# Patient Record
Sex: MALE | Race: WHITE | Employment: OTHER | URBAN - METROPOLITAN AREA
[De-identification: names, ages, dates, MRNs, and addresses within clinical notes are randomized per-mention and may not be internally consistent; named-entity substitution may affect disease eponyms.]

---

## 2017-01-09 ENCOUNTER — TELEPHONE (OUTPATIENT)
Dept: INTERNAL MEDICINE CLINIC | Age: 72
End: 2017-01-09

## 2017-01-12 ENCOUNTER — OFFICE VISIT (OUTPATIENT)
Dept: INTERNAL MEDICINE CLINIC | Age: 72
End: 2017-01-12

## 2017-01-12 VITALS
DIASTOLIC BLOOD PRESSURE: 74 MMHG | BODY MASS INDEX: 44.02 KG/M2 | HEART RATE: 64 BPM | WEIGHT: 233 LBS | SYSTOLIC BLOOD PRESSURE: 116 MMHG | RESPIRATION RATE: 14 BRPM

## 2017-01-12 DIAGNOSIS — E11.22 TYPE 2 DIABETES MELLITUS WITH STAGE 3 CHRONIC KIDNEY DISEASE, WITH LONG-TERM CURRENT USE OF INSULIN (HCC): ICD-10-CM

## 2017-01-12 DIAGNOSIS — Z79.4 TYPE 2 DIABETES MELLITUS WITH STAGE 3 CHRONIC KIDNEY DISEASE, WITH LONG-TERM CURRENT USE OF INSULIN (HCC): ICD-10-CM

## 2017-01-12 DIAGNOSIS — E04.1 LEFT THYROID NODULE: ICD-10-CM

## 2017-01-12 DIAGNOSIS — I10 ESSENTIAL HYPERTENSION: ICD-10-CM

## 2017-01-12 DIAGNOSIS — S22.41XA CLOSED FRACTURE OF MULTIPLE RIBS OF RIGHT SIDE, INITIAL ENCOUNTER: Primary | ICD-10-CM

## 2017-01-12 DIAGNOSIS — N18.30 CRF (CHRONIC RENAL FAILURE), STAGE 3 (MODERATE) (HCC): ICD-10-CM

## 2017-01-12 DIAGNOSIS — M48.061 LUMBAR SPINAL STENOSIS: ICD-10-CM

## 2017-01-12 DIAGNOSIS — N18.30 TYPE 2 DIABETES MELLITUS WITH STAGE 3 CHRONIC KIDNEY DISEASE, WITH LONG-TERM CURRENT USE OF INSULIN (HCC): ICD-10-CM

## 2017-01-12 PROCEDURE — 99213 OFFICE O/P EST LOW 20 MIN: CPT | Performed by: INTERNAL MEDICINE

## 2017-01-26 ENCOUNTER — OFFICE VISIT (OUTPATIENT)
Dept: INTERNAL MEDICINE CLINIC | Age: 72
End: 2017-01-26

## 2017-01-26 VITALS
SYSTOLIC BLOOD PRESSURE: 120 MMHG | WEIGHT: 223 LBS | BODY MASS INDEX: 42.14 KG/M2 | DIASTOLIC BLOOD PRESSURE: 70 MMHG | HEART RATE: 64 BPM | RESPIRATION RATE: 12 BRPM

## 2017-01-26 DIAGNOSIS — N18.30 CRF (CHRONIC RENAL FAILURE), STAGE 3 (MODERATE) (HCC): Primary | ICD-10-CM

## 2017-01-26 DIAGNOSIS — E66.01 MORBID OBESITY WITH BMI OF 40.0-44.9, ADULT (HCC): ICD-10-CM

## 2017-01-26 DIAGNOSIS — Z79.4 TYPE 2 DIABETES MELLITUS WITH STAGE 3 CHRONIC KIDNEY DISEASE, WITH LONG-TERM CURRENT USE OF INSULIN (HCC): ICD-10-CM

## 2017-01-26 DIAGNOSIS — N18.30 TYPE 2 DIABETES MELLITUS WITH STAGE 3 CHRONIC KIDNEY DISEASE, WITH LONG-TERM CURRENT USE OF INSULIN (HCC): ICD-10-CM

## 2017-01-26 DIAGNOSIS — I10 ESSENTIAL HYPERTENSION: ICD-10-CM

## 2017-01-26 DIAGNOSIS — S22.41XA CLOSED FRACTURE OF MULTIPLE RIBS OF RIGHT SIDE, INITIAL ENCOUNTER: ICD-10-CM

## 2017-01-26 DIAGNOSIS — E11.22 TYPE 2 DIABETES MELLITUS WITH STAGE 3 CHRONIC KIDNEY DISEASE, WITH LONG-TERM CURRENT USE OF INSULIN (HCC): ICD-10-CM

## 2017-01-26 DIAGNOSIS — M48.061 LUMBAR SPINAL STENOSIS: ICD-10-CM

## 2017-01-26 PROCEDURE — 99213 OFFICE O/P EST LOW 20 MIN: CPT | Performed by: INTERNAL MEDICINE

## 2017-01-26 RX ORDER — OXYCODONE HYDROCHLORIDE AND ACETAMINOPHEN 5; 325 MG/1; MG/1
1 TABLET ORAL EVERY 8 HOURS PRN
Qty: 120 TABLET | Refills: 0 | Status: SHIPPED | OUTPATIENT
Start: 2017-01-26 | End: 2017-02-27 | Stop reason: SDUPTHER

## 2017-02-24 ENCOUNTER — TELEPHONE (OUTPATIENT)
Dept: INTERNAL MEDICINE CLINIC | Age: 72
End: 2017-02-24

## 2017-02-27 ENCOUNTER — OFFICE VISIT (OUTPATIENT)
Dept: INTERNAL MEDICINE CLINIC | Age: 72
End: 2017-02-27

## 2017-02-27 ENCOUNTER — TELEPHONE (OUTPATIENT)
Dept: INTERNAL MEDICINE CLINIC | Age: 72
End: 2017-02-27

## 2017-02-27 VITALS
WEIGHT: 230 LBS | HEART RATE: 76 BPM | SYSTOLIC BLOOD PRESSURE: 130 MMHG | BODY MASS INDEX: 43.46 KG/M2 | DIASTOLIC BLOOD PRESSURE: 70 MMHG

## 2017-02-27 DIAGNOSIS — M54.50 ACUTE BILATERAL LOW BACK PAIN WITHOUT SCIATICA: ICD-10-CM

## 2017-02-27 DIAGNOSIS — S22.41XS CLOSED FRACTURE OF MULTIPLE RIBS OF RIGHT SIDE, SEQUELA: ICD-10-CM

## 2017-02-27 DIAGNOSIS — R26.9 GAIT DIFFICULTY: ICD-10-CM
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
## 2017-02-27 PROCEDURE — 99213 OFFICE O/P EST LOW 20 MIN: CPT | Performed by: INTERNAL MEDICINE

## 2017-02-27 RX ORDER — OXYCODONE HYDROCHLORIDE AND ACETAMINOPHEN 5; 325 MG/1; MG/1
1 TABLET ORAL EVERY 8 HOURS PRN
Qty: 120 TABLET | Refills: 0 | Status: SHIPPED | OUTPATIENT
Start: 2017-02-27 | End: 2017-03-28 | Stop reason: SDUPTHER

## 2017-03-21 ENCOUNTER — OFFICE VISIT (OUTPATIENT)
Dept: INTERNAL MEDICINE CLINIC | Age: 72
End: 2017-03-21

## 2017-03-21 VITALS
HEART RATE: 90 BPM | DIASTOLIC BLOOD PRESSURE: 75 MMHG | OXYGEN SATURATION: 96 % | TEMPERATURE: 98.5 F | BODY MASS INDEX: 42.51 KG/M2 | WEIGHT: 225 LBS | SYSTOLIC BLOOD PRESSURE: 135 MMHG

## 2017-03-21 DIAGNOSIS — E78.2 MIXED HYPERLIPIDEMIA: ICD-10-CM

## 2017-03-21 DIAGNOSIS — I10 ESSENTIAL HYPERTENSION: ICD-10-CM

## 2017-03-21 DIAGNOSIS — E11.22 TYPE 2 DIABETES MELLITUS WITH STAGE 3 CHRONIC KIDNEY DISEASE, WITH LONG-TERM CURRENT USE OF INSULIN (HCC): ICD-10-CM

## 2017-03-21 DIAGNOSIS — S22.41XS CLOSED FRACTURE OF MULTIPLE RIBS OF RIGHT SIDE, SEQUELA: ICD-10-CM

## 2017-03-21 DIAGNOSIS — E03.4 HYPOTHYROIDISM DUE TO ACQUIRED ATROPHY OF THYROID: ICD-10-CM

## 2017-03-21 DIAGNOSIS — N18.30 TYPE 2 DIABETES MELLITUS WITH STAGE 3 CHRONIC KIDNEY DISEASE, WITH LONG-TERM CURRENT USE OF INSULIN (HCC): ICD-10-CM

## 2017-03-21 DIAGNOSIS — J06.9 VIRAL URI: Primary | ICD-10-CM

## 2017-03-21 DIAGNOSIS — Z79.4 TYPE 2 DIABETES MELLITUS WITH STAGE 3 CHRONIC KIDNEY DISEASE, WITH LONG-TERM CURRENT USE OF INSULIN (HCC): ICD-10-CM

## 2017-03-21 PROCEDURE — 99213 OFFICE O/P EST LOW 20 MIN: CPT | Performed by: INTERNAL MEDICINE

## 2017-03-27 ENCOUNTER — TELEPHONE (OUTPATIENT)
Dept: INTERNAL MEDICINE CLINIC | Age: 72
End: 2017-03-27

## 2017-03-28 ENCOUNTER — OFFICE VISIT (OUTPATIENT)
Dept: INTERNAL MEDICINE CLINIC | Age: 72
End: 2017-03-28

## 2017-03-28 VITALS
DIASTOLIC BLOOD PRESSURE: 74 MMHG | SYSTOLIC BLOOD PRESSURE: 134 MMHG | WEIGHT: 225 LBS | BODY MASS INDEX: 42.51 KG/M2 | HEART RATE: 80 BPM | RESPIRATION RATE: 14 BRPM

## 2017-03-28 DIAGNOSIS — E66.01 MORBID OBESITY WITH BMI OF 40.0-44.9, ADULT (HCC): ICD-10-CM

## 2017-03-28 DIAGNOSIS — E11.22 TYPE 2 DIABETES MELLITUS WITH STAGE 3 CHRONIC KIDNEY DISEASE, WITH LONG-TERM CURRENT USE OF INSULIN (HCC): Primary | ICD-10-CM

## 2017-03-28 DIAGNOSIS — I10 ESSENTIAL HYPERTENSION: ICD-10-CM

## 2017-03-28 DIAGNOSIS — M48.061 LUMBAR SPINAL STENOSIS: ICD-10-CM

## 2017-03-28 DIAGNOSIS — N18.30 TYPE 2 DIABETES MELLITUS WITH STAGE 3 CHRONIC KIDNEY DISEASE, WITH LONG-TERM CURRENT USE OF INSULIN (HCC): Primary | ICD-10-CM

## 2017-03-28 DIAGNOSIS — Z79.4 TYPE 2 DIABETES MELLITUS WITH STAGE 3 CHRONIC KIDNEY DISEASE, WITH LONG-TERM CURRENT USE OF INSULIN (HCC): Primary | ICD-10-CM

## 2017-03-28 DIAGNOSIS — E78.2 MIXED HYPERLIPIDEMIA: ICD-10-CM

## 2017-03-28 PROCEDURE — 99213 OFFICE O/P EST LOW 20 MIN: CPT | Performed by: INTERNAL MEDICINE

## 2017-03-28 RX ORDER — OXYCODONE HYDROCHLORIDE AND ACETAMINOPHEN 5; 325 MG/1; MG/1
1 TABLET ORAL EVERY 8 HOURS PRN
Qty: 120 TABLET | Refills: 0 | Status: SHIPPED | OUTPATIENT
Start: 2017-03-28 | End: 2017-05-04 | Stop reason: SDUPTHER

## 2017-03-28 ASSESSMENT — PATIENT HEALTH QUESTIONNAIRE - PHQ9
2. FEELING DOWN, DEPRESSED OR HOPELESS: 0
SUM OF ALL RESPONSES TO PHQ QUESTIONS 1-9: 0
1. LITTLE INTEREST OR PLEASURE IN DOING THINGS: 0
SUM OF ALL RESPONSES TO PHQ9 QUESTIONS 1 & 2: 0

## 2017-05-03 ENCOUNTER — TELEPHONE (OUTPATIENT)
Dept: INTERNAL MEDICINE CLINIC | Age: 72
End: 2017-05-03

## 2017-05-04 ENCOUNTER — OFFICE VISIT (OUTPATIENT)
Dept: INTERNAL MEDICINE CLINIC | Age: 72
End: 2017-05-04

## 2017-05-04 VITALS
BODY MASS INDEX: 42.89 KG/M2 | WEIGHT: 227 LBS | HEART RATE: 68 BPM | SYSTOLIC BLOOD PRESSURE: 132 MMHG | DIASTOLIC BLOOD PRESSURE: 70 MMHG | RESPIRATION RATE: 12 BRPM

## 2017-05-04 DIAGNOSIS — Z79.4 TYPE 2 DIABETES MELLITUS WITH STAGE 3 CHRONIC KIDNEY DISEASE, WITH LONG-TERM CURRENT USE OF INSULIN (HCC): ICD-10-CM

## 2017-05-04 DIAGNOSIS — N18.30 TYPE 2 DIABETES MELLITUS WITH STAGE 3 CHRONIC KIDNEY DISEASE, WITH LONG-TERM CURRENT USE OF INSULIN (HCC): ICD-10-CM

## 2017-05-04 DIAGNOSIS — E66.01 MORBID OBESITY WITH BMI OF 40.0-44.9, ADULT (HCC): ICD-10-CM

## 2017-05-04 DIAGNOSIS — E11.22 TYPE 2 DIABETES MELLITUS WITH STAGE 3 CHRONIC KIDNEY DISEASE, WITH LONG-TERM CURRENT USE OF INSULIN (HCC): ICD-10-CM

## 2017-05-04 DIAGNOSIS — S22.41XS CLOSED FRACTURE OF MULTIPLE RIBS OF RIGHT SIDE, SEQUELA: Primary | ICD-10-CM

## 2017-05-04 DIAGNOSIS — M48.061 LUMBAR SPINAL STENOSIS: ICD-10-CM

## 2017-05-04 DIAGNOSIS — I10 ESSENTIAL HYPERTENSION: ICD-10-CM

## 2017-05-04 PROCEDURE — 99213 OFFICE O/P EST LOW 20 MIN: CPT | Performed by: INTERNAL MEDICINE

## 2017-05-04 PROCEDURE — 36415 COLL VENOUS BLD VENIPUNCTURE: CPT | Performed by: INTERNAL MEDICINE

## 2017-05-04 RX ORDER — LORAZEPAM 0.5 MG/1
0.5 TABLET ORAL NIGHTLY PRN
Qty: 90 TABLET | Refills: 0 | Status: SHIPPED | OUTPATIENT
Start: 2017-05-04 | End: 2017-07-25 | Stop reason: SDUPTHER

## 2017-05-04 RX ORDER — OXYCODONE HYDROCHLORIDE AND ACETAMINOPHEN 5; 325 MG/1; MG/1
1 TABLET ORAL EVERY 8 HOURS PRN
Qty: 120 TABLET | Refills: 0 | Status: SHIPPED | OUTPATIENT
Start: 2017-05-04 | End: 2017-06-23 | Stop reason: SDUPTHER

## 2017-05-05 LAB
ANION GAP SERPL CALCULATED.3IONS-SCNC: 16 MMOL/L (ref 3–16)
BUN BLDV-MCNC: 25 MG/DL (ref 7–20)
CALCIUM SERPL-MCNC: 8.8 MG/DL (ref 8.3–10.6)
CHLORIDE BLD-SCNC: 90 MMOL/L (ref 99–110)
CO2: 29 MMOL/L (ref 21–32)
CREAT SERPL-MCNC: 1.6 MG/DL (ref 0.8–1.3)
ESTIMATED AVERAGE GLUCOSE: 182.9 MG/DL
GFR AFRICAN AMERICAN: 52
GFR NON-AFRICAN AMERICAN: 43
GLUCOSE BLD-MCNC: 227 MG/DL (ref 70–99)
HBA1C MFR BLD: 8 %
POTASSIUM SERPL-SCNC: 4 MMOL/L (ref 3.5–5.1)
SODIUM BLD-SCNC: 135 MMOL/L (ref 136–145)

## 2017-06-06 ENCOUNTER — OFFICE VISIT (OUTPATIENT)
Dept: FAMILY MEDICINE CLINIC | Age: 72
End: 2017-06-06

## 2017-06-06 ENCOUNTER — HOSPITAL ENCOUNTER (OUTPATIENT)
Dept: GENERAL RADIOLOGY | Age: 72
Discharge: HOME | End: 2017-06-06
Attending: FAMILY MEDICINE | Admitting: FAMILY MEDICINE

## 2017-06-06 VITALS
DIASTOLIC BLOOD PRESSURE: 86 MMHG | OXYGEN SATURATION: 97 % | SYSTOLIC BLOOD PRESSURE: 134 MMHG | WEIGHT: 225.4 LBS | HEIGHT: 64 IN | BODY MASS INDEX: 38.48 KG/M2 | HEART RATE: 70 BPM

## 2017-06-06 DIAGNOSIS — R61 DIAPHORESIS: ICD-10-CM

## 2017-06-06 DIAGNOSIS — R10.11 RIGHT UPPER QUADRANT ABDOMINAL PAIN: Primary | ICD-10-CM

## 2017-06-06 DIAGNOSIS — R10.31 RIGHT LOWER QUADRANT ABDOMINAL PAIN: ICD-10-CM

## 2017-06-06 PROBLEM — S22.49XA: Status: ACTIVE | Noted: 2017-01-06

## 2017-06-06 PROCEDURE — 93000 ELECTROCARDIOGRAM COMPLETE: CPT | Performed by: FAMILY MEDICINE

## 2017-06-06 PROCEDURE — 99214 OFFICE O/P EST MOD 30 MIN: CPT | Performed by: FAMILY MEDICINE

## 2017-06-06 RX ORDER — DICYCLOMINE HYDROCHLORIDE 10 MG/1
10 CAPSULE ORAL 4 TIMES DAILY PRN
Qty: 60 CAPSULE | Refills: 0 | Status: SHIPPED | OUTPATIENT
Start: 2017-06-06 | End: 2020-01-27

## 2017-06-06 RX ORDER — TIMOLOL MALEATE 5 MG/ML
1 SOLUTION/ DROPS OPHTHALMIC DAILY
COMMUNITY
Start: 2017-06-03 | End: 2020-01-27

## 2017-06-06 ASSESSMENT — ENCOUNTER SYMPTOMS
VOMITING: 0
CONSTIPATION: 0
SHORTNESS OF BREATH: 0
BACK PAIN: 0
SORE THROAT: 0
FLATUS: 0
SINUS PRESSURE: 0
COUGH: 0
EYE DISCHARGE: 0
BELCHING: 0
DIARRHEA: 0
BLOOD IN STOOL: 0
ABDOMINAL PAIN: 1
NAUSEA: 0

## 2017-06-07 ENCOUNTER — OFFICE VISIT (OUTPATIENT)
Dept: CARDIOLOGY CLINIC | Age: 72
End: 2017-06-07

## 2017-06-07 VITALS
BODY MASS INDEX: 38.35 KG/M2 | HEART RATE: 74 BPM | DIASTOLIC BLOOD PRESSURE: 84 MMHG | HEIGHT: 64 IN | SYSTOLIC BLOOD PRESSURE: 160 MMHG | WEIGHT: 224.6 LBS

## 2017-06-07 DIAGNOSIS — I25.10 CORONARY ARTERY DISEASE INVOLVING NATIVE CORONARY ARTERY OF NATIVE HEART WITHOUT ANGINA PECTORIS: Primary | ICD-10-CM

## 2017-06-07 PROCEDURE — 99214 OFFICE O/P EST MOD 30 MIN: CPT | Performed by: INTERNAL MEDICINE

## 2017-06-08 ENCOUNTER — TELEPHONE (OUTPATIENT)
Dept: FAMILY MEDICINE CLINIC | Age: 72
End: 2017-06-08

## 2017-06-16 ENCOUNTER — PROCEDURE VISIT (OUTPATIENT)
Dept: CARDIOLOGY CLINIC | Age: 72
End: 2017-06-16

## 2017-06-16 DIAGNOSIS — I25.10 CORONARY ARTERY DISEASE INVOLVING NATIVE CORONARY ARTERY OF NATIVE HEART WITHOUT ANGINA PECTORIS: Primary | ICD-10-CM

## 2017-06-16 LAB
LV EF: 58 %
LVEF MODALITY: NORMAL

## 2017-06-16 PROCEDURE — 93306 TTE W/DOPPLER COMPLETE: CPT | Performed by: INTERNAL MEDICINE

## 2017-06-19 ENCOUNTER — TELEPHONE (OUTPATIENT)
Dept: CARDIOLOGY CLINIC | Age: 72
End: 2017-06-19

## 2017-06-23 ENCOUNTER — OFFICE VISIT (OUTPATIENT)
Dept: INTERNAL MEDICINE CLINIC | Age: 72
End: 2017-06-23

## 2017-06-23 VITALS
SYSTOLIC BLOOD PRESSURE: 110 MMHG | HEART RATE: 80 BPM | WEIGHT: 222.2 LBS | BODY MASS INDEX: 38.14 KG/M2 | DIASTOLIC BLOOD PRESSURE: 70 MMHG

## 2017-06-23 DIAGNOSIS — N18.30 TYPE 2 DIABETES MELLITUS WITH STAGE 3 CHRONIC KIDNEY DISEASE, WITH LONG-TERM CURRENT USE OF INSULIN (HCC): ICD-10-CM

## 2017-06-23 DIAGNOSIS — I10 ESSENTIAL HYPERTENSION: ICD-10-CM

## 2017-06-23 DIAGNOSIS — R10.84 GENERALIZED ABDOMINAL PAIN: Primary | ICD-10-CM

## 2017-06-23 DIAGNOSIS — E78.2 MIXED HYPERLIPIDEMIA: ICD-10-CM

## 2017-06-23 DIAGNOSIS — E11.22 TYPE 2 DIABETES MELLITUS WITH STAGE 3 CHRONIC KIDNEY DISEASE, WITH LONG-TERM CURRENT USE OF INSULIN (HCC): ICD-10-CM

## 2017-06-23 DIAGNOSIS — R10.11 RUQ ABDOMINAL PAIN: ICD-10-CM

## 2017-06-23 DIAGNOSIS — Z79.4 TYPE 2 DIABETES MELLITUS WITH STAGE 3 CHRONIC KIDNEY DISEASE, WITH LONG-TERM CURRENT USE OF INSULIN (HCC): ICD-10-CM

## 2017-06-23 DIAGNOSIS — E66.01 MORBID OBESITY WITH BMI OF 40.0-44.9, ADULT (HCC): ICD-10-CM

## 2017-06-23 PROCEDURE — 99213 OFFICE O/P EST LOW 20 MIN: CPT | Performed by: INTERNAL MEDICINE

## 2017-06-23 PROCEDURE — 36415 COLL VENOUS BLD VENIPUNCTURE: CPT | Performed by: INTERNAL MEDICINE

## 2017-06-23 RX ORDER — OXYCODONE HYDROCHLORIDE AND ACETAMINOPHEN 5; 325 MG/1; MG/1
1 TABLET ORAL EVERY 8 HOURS PRN
Qty: 120 TABLET | Refills: 0 | Status: SHIPPED | OUTPATIENT
Start: 2017-06-23 | End: 2017-07-25 | Stop reason: SDUPTHER

## 2017-06-24 LAB
ALBUMIN SERPL-MCNC: 4 G/DL (ref 3.4–5)
ALP BLD-CCNC: 86 U/L (ref 40–129)
ALT SERPL-CCNC: 19 U/L (ref 10–40)
ANION GAP SERPL CALCULATED.3IONS-SCNC: 17 MMOL/L (ref 3–16)
AST SERPL-CCNC: 21 U/L (ref 15–37)
BILIRUB SERPL-MCNC: 0.4 MG/DL (ref 0–1)
BILIRUBIN DIRECT: <0.2 MG/DL (ref 0–0.3)
BILIRUBIN, INDIRECT: NORMAL MG/DL (ref 0–1)
BUN BLDV-MCNC: 19 MG/DL (ref 7–20)
CALCIUM SERPL-MCNC: 9.1 MG/DL (ref 8.3–10.6)
CHLORIDE BLD-SCNC: 94 MMOL/L (ref 99–110)
CO2: 28 MMOL/L (ref 21–32)
CREAT SERPL-MCNC: 1.4 MG/DL (ref 0.8–1.3)
GFR AFRICAN AMERICAN: >60
GFR NON-AFRICAN AMERICAN: 50
GLUCOSE BLD-MCNC: 68 MG/DL (ref 70–99)
HCT VFR BLD CALC: 46.8 % (ref 40.5–52.5)
HEMOGLOBIN: 15.2 G/DL (ref 13.5–17.5)
MCH RBC QN AUTO: 30.5 PG (ref 26–34)
MCHC RBC AUTO-ENTMCNC: 32.6 G/DL (ref 31–36)
MCV RBC AUTO: 93.8 FL (ref 80–100)
PDW BLD-RTO: 13.7 % (ref 12.4–15.4)
PLATELET # BLD: 194 K/UL (ref 135–450)
PMV BLD AUTO: 7.8 FL (ref 5–10.5)
POTASSIUM SERPL-SCNC: 3.7 MMOL/L (ref 3.5–5.1)
RBC # BLD: 4.99 M/UL (ref 4.2–5.9)
SODIUM BLD-SCNC: 139 MMOL/L (ref 136–145)
TOTAL PROTEIN: 6.6 G/DL (ref 6.4–8.2)
WBC # BLD: 6.1 K/UL (ref 4–11)

## 2017-07-10 ENCOUNTER — TELEPHONE (OUTPATIENT)
Dept: INTERNAL MEDICINE CLINIC | Age: 72
End: 2017-07-10

## 2017-07-10 DIAGNOSIS — I10 ESSENTIAL HYPERTENSION, MALIGNANT: ICD-10-CM

## 2017-07-10 DIAGNOSIS — Z79.4 TYPE 2 DIABETES MELLITUS WITHOUT COMPLICATION, WITH LONG-TERM CURRENT USE OF INSULIN (HCC): Primary | ICD-10-CM

## 2017-07-10 DIAGNOSIS — E05.90 HYPERTHYROIDISM: ICD-10-CM

## 2017-07-10 DIAGNOSIS — E11.9 TYPE 2 DIABETES MELLITUS WITHOUT COMPLICATION, WITH LONG-TERM CURRENT USE OF INSULIN (HCC): Primary | ICD-10-CM

## 2017-07-10 DIAGNOSIS — E78.2 MIXED HYPERLIPIDEMIA: ICD-10-CM

## 2017-07-11 ENCOUNTER — NURSE ONLY (OUTPATIENT)
Dept: INTERNAL MEDICINE CLINIC | Age: 72
End: 2017-07-11

## 2017-07-11 DIAGNOSIS — E05.90 HYPERTHYROIDISM: ICD-10-CM

## 2017-07-11 DIAGNOSIS — Z79.4 TYPE 2 DIABETES MELLITUS WITHOUT COMPLICATION, WITH LONG-TERM CURRENT USE OF INSULIN (HCC): ICD-10-CM

## 2017-07-11 DIAGNOSIS — E78.2 MIXED HYPERLIPIDEMIA: ICD-10-CM

## 2017-07-11 DIAGNOSIS — E11.9 TYPE 2 DIABETES MELLITUS WITHOUT COMPLICATION, WITH LONG-TERM CURRENT USE OF INSULIN (HCC): ICD-10-CM

## 2017-07-11 DIAGNOSIS — I10 ESSENTIAL HYPERTENSION, MALIGNANT: ICD-10-CM

## 2017-07-11 LAB
ALBUMIN SERPL-MCNC: 3.8 G/DL (ref 3.4–5)
ALP BLD-CCNC: 84 U/L (ref 40–129)
ALT SERPL-CCNC: 9 U/L (ref 10–40)
ANION GAP SERPL CALCULATED.3IONS-SCNC: 14 MMOL/L (ref 3–16)
AST SERPL-CCNC: 21 U/L (ref 15–37)
BASOPHILS ABSOLUTE: 0 K/UL (ref 0–0.2)
BASOPHILS RELATIVE PERCENT: 0.9 %
BILIRUB SERPL-MCNC: 0.6 MG/DL (ref 0–1)
BILIRUBIN DIRECT: <0.2 MG/DL (ref 0–0.3)
BILIRUBIN, INDIRECT: ABNORMAL MG/DL (ref 0–1)
BUN BLDV-MCNC: 18 MG/DL (ref 7–20)
CALCIUM SERPL-MCNC: 8.8 MG/DL (ref 8.3–10.6)
CHLORIDE BLD-SCNC: 93 MMOL/L (ref 99–110)
CHOLESTEROL, TOTAL: 158 MG/DL (ref 0–199)
CO2: 30 MMOL/L (ref 21–32)
CREAT SERPL-MCNC: 1.5 MG/DL (ref 0.8–1.3)
EOSINOPHILS ABSOLUTE: 0.2 K/UL (ref 0–0.6)
EOSINOPHILS RELATIVE PERCENT: 4.1 %
GFR AFRICAN AMERICAN: 55
GFR NON-AFRICAN AMERICAN: 46
GLUCOSE BLD-MCNC: 127 MG/DL (ref 70–99)
HCT VFR BLD CALC: 44.1 % (ref 40.5–52.5)
HDLC SERPL-MCNC: 65 MG/DL (ref 40–60)
HEMOGLOBIN: 14.7 G/DL (ref 13.5–17.5)
LDL CHOLESTEROL CALCULATED: 62 MG/DL
LYMPHOCYTES ABSOLUTE: 1.4 K/UL (ref 1–5.1)
LYMPHOCYTES RELATIVE PERCENT: 27.4 %
MCH RBC QN AUTO: 31.2 PG (ref 26–34)
MCHC RBC AUTO-ENTMCNC: 33.4 G/DL (ref 31–36)
MCV RBC AUTO: 93.4 FL (ref 80–100)
MONOCYTES ABSOLUTE: 0.6 K/UL (ref 0–1.3)
MONOCYTES RELATIVE PERCENT: 11.3 %
NEUTROPHILS ABSOLUTE: 2.9 K/UL (ref 1.7–7.7)
NEUTROPHILS RELATIVE PERCENT: 56.3 %
PDW BLD-RTO: 13.4 % (ref 12.4–15.4)
PLATELET # BLD: 212 K/UL (ref 135–450)
PMV BLD AUTO: 7.8 FL (ref 5–10.5)
POTASSIUM SERPL-SCNC: 3.9 MMOL/L (ref 3.5–5.1)
RBC # BLD: 4.72 M/UL (ref 4.2–5.9)
SODIUM BLD-SCNC: 137 MMOL/L (ref 136–145)
TOTAL CK: 156 U/L (ref 39–308)
TOTAL PROTEIN: 6.2 G/DL (ref 6.4–8.2)
TRIGL SERPL-MCNC: 154 MG/DL (ref 0–150)
TSH SERPL DL<=0.05 MIU/L-ACNC: 2.56 UIU/ML (ref 0.27–4.2)
VLDLC SERPL CALC-MCNC: 31 MG/DL
WBC # BLD: 5.2 K/UL (ref 4–11)

## 2017-07-11 PROCEDURE — 36415 COLL VENOUS BLD VENIPUNCTURE: CPT | Performed by: INTERNAL MEDICINE

## 2017-07-11 RX ORDER — TIZANIDINE HYDROCHLORIDE 2 MG/1
CAPSULE, GELATIN COATED ORAL
Qty: 180 CAPSULE | Refills: 2 | Status: SHIPPED | OUTPATIENT
Start: 2017-07-11 | End: 2018-03-10 | Stop reason: SDUPTHER

## 2017-07-12 LAB
ESTIMATED AVERAGE GLUCOSE: 180 MG/DL
HBA1C MFR BLD: 7.9 %

## 2017-07-24 ENCOUNTER — TELEPHONE (OUTPATIENT)
Dept: INTERNAL MEDICINE CLINIC | Age: 72
End: 2017-07-24

## 2017-07-25 ENCOUNTER — OFFICE VISIT (OUTPATIENT)
Dept: INTERNAL MEDICINE CLINIC | Age: 72
End: 2017-07-25

## 2017-07-25 VITALS
BODY MASS INDEX: 39.14 KG/M2 | DIASTOLIC BLOOD PRESSURE: 78 MMHG | SYSTOLIC BLOOD PRESSURE: 136 MMHG | HEART RATE: 80 BPM | WEIGHT: 228 LBS

## 2017-07-25 DIAGNOSIS — E11.9 TYPE 2 DIABETES MELLITUS WITHOUT COMPLICATION, WITH LONG-TERM CURRENT USE OF INSULIN (HCC): Primary | ICD-10-CM

## 2017-07-25 DIAGNOSIS — Z79.4 TYPE 2 DIABETES MELLITUS WITHOUT COMPLICATION, WITH LONG-TERM CURRENT USE OF INSULIN (HCC): Primary | ICD-10-CM

## 2017-07-25 DIAGNOSIS — E78.2 MIXED HYPERLIPIDEMIA: ICD-10-CM

## 2017-07-25 DIAGNOSIS — N18.30 CRF (CHRONIC RENAL FAILURE), STAGE 3 (MODERATE) (HCC): ICD-10-CM

## 2017-07-25 DIAGNOSIS — I10 ESSENTIAL HYPERTENSION: ICD-10-CM

## 2017-07-25 PROCEDURE — 99213 OFFICE O/P EST LOW 20 MIN: CPT | Performed by: INTERNAL MEDICINE

## 2017-07-25 RX ORDER — OXYCODONE HYDROCHLORIDE AND ACETAMINOPHEN 5; 325 MG/1; MG/1
1 TABLET ORAL EVERY 8 HOURS PRN
Qty: 120 TABLET | Refills: 0 | Status: SHIPPED | OUTPATIENT
Start: 2017-07-25 | End: 2017-09-01 | Stop reason: SDUPTHER

## 2017-07-25 RX ORDER — LORAZEPAM 0.5 MG/1
0.5 TABLET ORAL NIGHTLY PRN
Qty: 90 TABLET | Refills: 0 | Status: SHIPPED | OUTPATIENT
Start: 2017-07-25 | End: 2017-11-21 | Stop reason: SDUPTHER

## 2017-07-27 RX ORDER — DULOXETIN HYDROCHLORIDE 30 MG/1
CAPSULE, DELAYED RELEASE ORAL
Qty: 90 CAPSULE | Refills: 3 | Status: SHIPPED | OUTPATIENT
Start: 2017-07-27 | End: 2018-06-12 | Stop reason: SDUPTHER

## 2017-08-15 ENCOUNTER — TELEPHONE (OUTPATIENT)
Dept: INTERNAL MEDICINE CLINIC | Age: 72
End: 2017-08-15

## 2017-08-16 ENCOUNTER — TELEPHONE (OUTPATIENT)
Dept: INTERNAL MEDICINE CLINIC | Age: 72
End: 2017-08-16

## 2017-09-01 DIAGNOSIS — G89.29 OTHER CHRONIC PAIN: ICD-10-CM

## 2017-09-02 RX ORDER — OXYCODONE HYDROCHLORIDE AND ACETAMINOPHEN 5; 325 MG/1; MG/1
1 TABLET ORAL EVERY 8 HOURS PRN
Qty: 120 TABLET | Refills: 0 | Status: SHIPPED | OUTPATIENT
Start: 2017-09-02 | End: 2017-10-06 | Stop reason: SDUPTHER

## 2017-09-07 RX ORDER — ATORVASTATIN CALCIUM 20 MG/1
TABLET, FILM COATED ORAL
Qty: 90 TABLET | Refills: 3 | Status: SHIPPED | OUTPATIENT
Start: 2017-09-07 | End: 2018-08-21 | Stop reason: SDUPTHER

## 2017-09-07 RX ORDER — FUROSEMIDE 40 MG/1
TABLET ORAL
Qty: 90 TABLET | Refills: 3 | Status: SHIPPED | OUTPATIENT
Start: 2017-09-07 | End: 2018-08-21 | Stop reason: SDUPTHER

## 2017-09-22 ENCOUNTER — OFFICE VISIT (OUTPATIENT)
Dept: INTERNAL MEDICINE CLINIC | Age: 72
End: 2017-09-22

## 2017-09-22 VITALS
WEIGHT: 224 LBS | SYSTOLIC BLOOD PRESSURE: 125 MMHG | DIASTOLIC BLOOD PRESSURE: 80 MMHG | HEART RATE: 88 BPM | BODY MASS INDEX: 38.45 KG/M2

## 2017-09-22 DIAGNOSIS — N18.30 CRF (CHRONIC RENAL FAILURE), STAGE 3 (MODERATE) (HCC): ICD-10-CM

## 2017-09-22 DIAGNOSIS — I87.2 VENOUS INSUFFICIENCY OF BOTH LOWER EXTREMITIES: ICD-10-CM

## 2017-09-22 DIAGNOSIS — Z79.4 TYPE 2 DIABETES MELLITUS WITH STAGE 3 CHRONIC KIDNEY DISEASE, WITH LONG-TERM CURRENT USE OF INSULIN (HCC): ICD-10-CM

## 2017-09-22 DIAGNOSIS — N18.30 TYPE 2 DIABETES MELLITUS WITH STAGE 3 CHRONIC KIDNEY DISEASE, WITH LONG-TERM CURRENT USE OF INSULIN (HCC): ICD-10-CM

## 2017-09-22 DIAGNOSIS — S81.801A LEG WOUND, RIGHT, INITIAL ENCOUNTER: Primary | ICD-10-CM

## 2017-09-22 DIAGNOSIS — D50.9 IRON DEFICIENCY ANEMIA, UNSPECIFIED IRON DEFICIENCY ANEMIA TYPE: ICD-10-CM

## 2017-09-22 DIAGNOSIS — E11.22 TYPE 2 DIABETES MELLITUS WITH STAGE 3 CHRONIC KIDNEY DISEASE, WITH LONG-TERM CURRENT USE OF INSULIN (HCC): ICD-10-CM

## 2017-09-22 LAB
ALBUMIN SERPL-MCNC: 4.1 G/DL (ref 3.4–5)
ALP BLD-CCNC: 97 U/L (ref 40–129)
ALT SERPL-CCNC: 17 U/L (ref 10–40)
ANION GAP SERPL CALCULATED.3IONS-SCNC: 15 MMOL/L (ref 3–16)
AST SERPL-CCNC: 19 U/L (ref 15–37)
BASOPHILS ABSOLUTE: 0 K/UL (ref 0–0.2)
BASOPHILS RELATIVE PERCENT: 0.5 %
BILIRUB SERPL-MCNC: 0.4 MG/DL (ref 0–1)
BILIRUBIN DIRECT: <0.2 MG/DL (ref 0–0.3)
BILIRUBIN, INDIRECT: NORMAL MG/DL (ref 0–1)
BUN BLDV-MCNC: 19 MG/DL (ref 7–20)
CALCIUM SERPL-MCNC: 9.2 MG/DL (ref 8.3–10.6)
CHLORIDE BLD-SCNC: 92 MMOL/L (ref 99–110)
CO2: 29 MMOL/L (ref 21–32)
CREAT SERPL-MCNC: 1.3 MG/DL (ref 0.8–1.3)
EOSINOPHILS ABSOLUTE: 0.1 K/UL (ref 0–0.6)
EOSINOPHILS RELATIVE PERCENT: 2 %
FERRITIN: 70.7 NG/ML (ref 30–400)
GFR AFRICAN AMERICAN: >60
GFR NON-AFRICAN AMERICAN: 54
GLUCOSE BLD-MCNC: 291 MG/DL (ref 70–99)
HCT VFR BLD CALC: 48.1 % (ref 40.5–52.5)
HEMOGLOBIN: 15.9 G/DL (ref 13.5–17.5)
IRON: 96 UG/DL (ref 59–158)
LYMPHOCYTES ABSOLUTE: 1 K/UL (ref 1–5.1)
LYMPHOCYTES RELATIVE PERCENT: 20.4 %
MAGNESIUM: 2.1 MG/DL (ref 1.8–2.4)
MCH RBC QN AUTO: 30.8 PG (ref 26–34)
MCHC RBC AUTO-ENTMCNC: 33 G/DL (ref 31–36)
MCV RBC AUTO: 93.4 FL (ref 80–100)
MONOCYTES ABSOLUTE: 0.5 K/UL (ref 0–1.3)
MONOCYTES RELATIVE PERCENT: 9.5 %
NEUTROPHILS ABSOLUTE: 3.5 K/UL (ref 1.7–7.7)
NEUTROPHILS RELATIVE PERCENT: 67.6 %
PDW BLD-RTO: 13.6 % (ref 12.4–15.4)
PLATELET # BLD: 211 K/UL (ref 135–450)
PMV BLD AUTO: 8.1 FL (ref 5–10.5)
POTASSIUM SERPL-SCNC: 4 MMOL/L (ref 3.5–5.1)
RBC # BLD: 5.15 M/UL (ref 4.2–5.9)
SODIUM BLD-SCNC: 136 MMOL/L (ref 136–145)
TOTAL PROTEIN: 6.7 G/DL (ref 6.4–8.2)
WBC # BLD: 5.1 K/UL (ref 4–11)

## 2017-09-22 PROCEDURE — 99214 OFFICE O/P EST MOD 30 MIN: CPT | Performed by: INTERNAL MEDICINE

## 2017-09-22 PROCEDURE — 36415 COLL VENOUS BLD VENIPUNCTURE: CPT | Performed by: INTERNAL MEDICINE

## 2017-09-22 RX ORDER — METOLAZONE 2.5 MG/1
TABLET ORAL
Qty: 4 TABLET | Refills: 1 | Status: SHIPPED | OUTPATIENT
Start: 2017-09-22 | End: 2019-01-18 | Stop reason: SDUPTHER

## 2017-09-22 RX ORDER — ALLOPURINOL 100 MG/1
TABLET ORAL
Qty: 90 TABLET | Refills: 3 | Status: SHIPPED | OUTPATIENT
Start: 2017-09-22 | End: 2018-10-28 | Stop reason: SDUPTHER

## 2017-09-22 RX ORDER — DILTIAZEM HYDROCHLORIDE 180 MG/1
CAPSULE, EXTENDED RELEASE ORAL
Qty: 90 CAPSULE | Refills: 3 | Status: SHIPPED | OUTPATIENT
Start: 2017-09-22 | End: 2018-09-13 | Stop reason: SDUPTHER

## 2017-09-22 RX ORDER — HYDROCHLOROTHIAZIDE 25 MG/1
TABLET ORAL
Qty: 90 TABLET | Refills: 3 | Status: SHIPPED | OUTPATIENT
Start: 2017-09-22 | End: 2018-09-13 | Stop reason: SDUPTHER

## 2017-09-23 LAB
ESTIMATED AVERAGE GLUCOSE: 182.9 MG/DL
HBA1C MFR BLD: 8 %

## 2017-09-26 ENCOUNTER — TELEPHONE (OUTPATIENT)
Dept: INTERNAL MEDICINE CLINIC | Age: 72
End: 2017-09-26

## 2017-10-01 NOTE — PROGRESS NOTES
S: Patient presents with problems of CRF, Diabetes mellitus, ASHD, HTN, and lumbar spinal stenosis. He is currently receiving lumbar epidural steroid injections and is on chronic pain medication for his lumbar spinal stenosis. For his diabetes glucoses at home have been less than 140. He is using a sliding scale when he receives to steroid injections. His Wife is present. In today with complaints of fatigue and has been using his medications as directed. He is following a no added salt diet and using his Lasix for lower extremity edema from venous insufficiency. He is also using his support stockings. He has recently noted increased lower extremity edema with a right leg wound leaking a serosanguineous fluid. No fever or chills. O:Blood pressure 125/80, pulse 88, weight 224 lb (101.6 kg). Neck: No palpable lymph nodes, normal thyroid examination. Lungs: clear      Cardio: reg pulse, grade I systolic murmur      Abd: non tender      Back: tenderness and spasms of lumbar paraspinal muscles      Ext: bilateral lower ext pitting edema, right leg with anterior leaking of serosanguinous fluid.            Labs: from 7/11/17 CBC normal, Liver & CK normal, TSH 2.56, LDL 62 HDL 65 Trig 154, Lytes normal, BUN 18 Creat 1.5, Glucose 127, Hgba1c 7.9%        A: venous insufficiency of lower exts      Right leg skin wound, leaking serosanguinous fluid      CRF       Diabetes mellitus      S/p nephrectomy for renal cancer      Hypertension controlled    P: Basic chem Magnesium CBC Iron Ferritin Liver Hgba1c      Bactroban Ointment apply tid       Zaroxolyn 2.5 mg 1 tab qweek, 30 minutes before lasix      Monitor daily weights      If no improvement will order Unna boot      Return in 2 weeks

## 2017-10-06 ENCOUNTER — OFFICE VISIT (OUTPATIENT)
Dept: INTERNAL MEDICINE CLINIC | Age: 72
End: 2017-10-06

## 2017-10-06 VITALS
SYSTOLIC BLOOD PRESSURE: 124 MMHG | WEIGHT: 224.2 LBS | HEART RATE: 67 BPM | BODY MASS INDEX: 38.28 KG/M2 | HEIGHT: 64 IN | DIASTOLIC BLOOD PRESSURE: 78 MMHG

## 2017-10-06 DIAGNOSIS — N18.30 TYPE 2 DIABETES MELLITUS WITH STAGE 3 CHRONIC KIDNEY DISEASE, WITH LONG-TERM CURRENT USE OF INSULIN (HCC): ICD-10-CM

## 2017-10-06 DIAGNOSIS — Z23 NEEDS FLU SHOT: Primary | ICD-10-CM

## 2017-10-06 DIAGNOSIS — G89.29 OTHER CHRONIC PAIN: ICD-10-CM

## 2017-10-06 DIAGNOSIS — M48.062 SPINAL STENOSIS OF LUMBAR REGION WITH NEUROGENIC CLAUDICATION: ICD-10-CM

## 2017-10-06 DIAGNOSIS — Z79.4 TYPE 2 DIABETES MELLITUS WITH STAGE 3 CHRONIC KIDNEY DISEASE, WITH LONG-TERM CURRENT USE OF INSULIN (HCC): ICD-10-CM

## 2017-10-06 DIAGNOSIS — I87.2 VENOUS INSUFFICIENCY OF BOTH LOWER EXTREMITIES: ICD-10-CM

## 2017-10-06 DIAGNOSIS — E11.22 TYPE 2 DIABETES MELLITUS WITH STAGE 3 CHRONIC KIDNEY DISEASE, WITH LONG-TERM CURRENT USE OF INSULIN (HCC): ICD-10-CM

## 2017-10-06 PROCEDURE — G0008 ADMIN INFLUENZA VIRUS VAC: HCPCS | Performed by: INTERNAL MEDICINE

## 2017-10-06 PROCEDURE — 99213 OFFICE O/P EST LOW 20 MIN: CPT | Performed by: INTERNAL MEDICINE

## 2017-10-06 PROCEDURE — 90662 IIV NO PRSV INCREASED AG IM: CPT | Performed by: INTERNAL MEDICINE

## 2017-10-06 RX ORDER — OXYCODONE HYDROCHLORIDE AND ACETAMINOPHEN 5; 325 MG/1; MG/1
1 TABLET ORAL EVERY 8 HOURS PRN
Qty: 120 TABLET | Refills: 0 | Status: SHIPPED | OUTPATIENT
Start: 2017-10-06 | End: 2017-11-06 | Stop reason: SDUPTHER

## 2017-10-06 NOTE — MR AVS SNAPSHOT
After Visit Summary             Jaqui Santiago   10/6/2017 1:15 PM   Office Visit    Description:  Male : 1945   Provider:  Janelle Galaviz MD   Department:  [facility] Internal Medicine              Your Follow-Up and Future Appointments         Below is a list of your follow-up and future appointments. This may not be a complete list as you may have made appointments directly with providers that we are not aware of or your providers may have made some for you. Please call your providers to confirm appointments. It is important to keep your appointments. Please bring your current insurance card, photo ID, co-pay, and all medication bottles to your appointment. If self-pay, payment is expected at the time of service. Your To-Do List     Future Appointments Provider Department Dept Phone    2017 8:00 AM SCHEDULE, [address] [facility] Internal Medicine [phone]    If this is a fasting lab, please do not eat or drink past midnight other than water. 2017 1:30 PM Janelle Galaviz MD [facility] Internal Medicine [phone]    Please arrive 15 minutes prior to appointment, bring photo ID and insurance card. 2017 10:20 AM Meche Kent MD Cardiology [place] [address] [phone]    Please arrive 15 minutes prior to appointment, bring photo ID and insurance card. 2018 9:00 AM Janelle Galaviz MD [facility] [phone]    If this is a sports or school physical please bring the physical form with you.    2018 9:40 AM Janelle Galaviz MD [facility] Internal Medicine [phone]    Please arrive 15 minutes prior to appointment, bring photo ID and insurance card.          Information from Your Visit        Department     Name Address Phone Fax    [facility] Internal Medicine [address]  [place] [address] [phone]      You Were Seen for:         Comments    Needs flu shot   [[id number]]         Vital Signs Blood Pressure Pulse Height Weight Body Mass Index Smoking Status    124/78 (Site: Left Arm, Position: Sitting, Cuff Size: Medium Adult) 67 5' 4\" (1.626 m) 224 lb 3.2 oz (101.7 kg) 38.48 kg/m2 Former Smoker      Additional Information about your Body Mass Index (BMI)           Your BMI as listed above is considered obese (30 or more). BMI is an estimate of body fat, calculated from your height and weight. The higher your BMI, the greater your risk of heart disease, high blood pressure, type 2 diabetes, stroke, gallstones, arthritis, sleep apnea, and certain cancers. BMI is not perfect. It may overestimate body fat in athletes and people who are more muscular. Even a small weight loss (between 5 and 10 percent of your current weight) by decreasing your calorie intake and becoming more physically active will help lower your risk of developing or worsening diseases associated with obesity. Learn more at: [employer]             Where to Get Your Medications      You can get these medications from any pharmacy     Bring a paper prescription for each of these medications     oxyCODONE-acetaminophen 5-325 MG per tablet         Your Current Medications Are              oxyCODONE-acetaminophen (PERCOCET) 5-325 MG per tablet Take 1 tablet by mouth every 8 hours as needed for Pain  May go to 1 every 6 hrs as needed for extreme pain. Buddy [place] Date: 10/6/17    CARTIA  MG extended release capsule TAKE 1 CAPSULE DAILY    allopurinol (ZYLOPRIM) 100 MG tablet TAKE 1 TABLET DAILY FOR ELEVATED URIC ACID    hydrochlorothiazide (HYDRODIURIL) 25 MG tablet TAKE 1 TABLET DAILY    mupirocin (BACTROBAN) 2 % ointment Apply topically 3 times daily. metolazone (ZAROXOLYN) 2.5 MG tablet Take one tablet once a week. Take 30 minutes before the Lasix.     atorvastatin (LIPITOR) 20 MG tablet TAKE 1 TABLET DAILY    sertraline (ZOLOFT) 50 MG tablet TAKE 2 TABLETS DAILY furosemide (LASIX) 40 MG tablet TAKE 1 TABLET DAILY    DULoxetine (CYMBALTA) 30 MG extended release capsule TAKE 1 CAPSULE DAILY    LORazepam (ATIVAN) 0.5 MG tablet Take 1 tablet by mouth nightly as needed for Anxiety    tiZANidine (ZANAFLEX) 2 MG capsule TAKE 1 CAPSULE TWICE A DAY AS NEEDED FOR MUSCLE SPASMS    timolol (TIMOPTIC) 0.5 % ophthalmic solution Apply 1 drop to eye daily    Probiotic Product (ALIGN PO) Take 1 tablet by mouth daily    Simethicone (GAS-X PO) Take 2 tablets by mouth Three to four times daily as needed    dicyclomine (BENTYL) 10 MG capsule Take 1 capsule by mouth 4 times daily as needed (abdominal pain)    gabapentin (NEURONTIN) 300 MG capsule Take 1 capsule in the afternoon and take 2 capsules at bedtime    Insulin Syringe-Needle U-100 (B-D INS SYR ULTRAFINE .3CC/30G) 30G X 1/2\" 0.3 ML MISC Inject and use twice daily   Dx: E11.9  Diabetes Type 1    Insulin Syringe-Needle U-100 30G X 1/2\" 0.5 ML MISC 1 each by Does not apply route 2 times daily B-D Microfine   Uses twice daily or prn w/ sliding scale  Dx:  Diabetes Type 1  E11.9    traZODone (DESYREL) 100 MG tablet Take 1 tablet by mouth nightly    omeprazole (PRILOSEC) 40 MG capsule Take 1 capsule by mouth daily    potassium chloride SA (K-DUR;KLOR-CON M) 20 MEQ tablet Take 1 tablet by mouth 2 times daily    carbidopa-levodopa (SINEMET)  MG per tablet Take 1.5 tablets by mouth nightly    insulin lispro (HUMALOG) 100 UNIT/ML injection vial Varies 6-8 units w meals twice daily    insulin NPH (HUMULIN N) 100 UNIT/ML injection vial Inject 35 units in the am and inject 10 units in pm    nitroGLYCERIN (NITROSTAT) 0.4 MG SL tablet Place 1 tablet under the tongue every 5 minutes as needed for Chest pain    Glucose Blood (BLOOD GLUCOSE TEST STRIPS) STRP [place] Contour glucose test strips-uses 4x per day or prn with sliding scale coverage   Dx:250.01 Type 1 Diabetes    mupirocin (BACTROBAN) 2 % ointment Apply topically 3 times daily. ammonium lactate (LAC-HYDRIN) 12 % lotion Apply to nails, trunk, arms, legs BID    ciclopirox (LOPROX) 0.77 % cream Apply to legs, buttocks BID PRN flares    ZACH MICROLET LANCETS MISC Tests 4 to 5 times daily or prn    Polyethylene Glycol 3350 (MIRALAX PO) Take 1 capsule by mouth as needed     B Complex-Biotin-FA (VITAMIN B50 COMPLEX PO) Take 1 tablet by mouth daily. Vitamin D (CHOLECALCIFEROL) 1000 UNITS CAPS capsule Take 1,000 Units by mouth daily. latanoprost (XALATAN) 0.005 % ophthalmic solution Place 1 drop into both eyes daily.     docusate sodium (COLACE) 100 MG capsule Take 100 mg by mouth 2 times daily       Allergies              Shellfish-derived Products     \"Coma\"    Iodides       We Ordered/Performed the following           INFLUENZA, HIGH DOSE, 65 YRS +, IM, PF, PREFILL SYR, 0.5ML (FLUZONE HD)          Additional Information        Basic Information     Date Of Birth Sex Race Ethnicity Preferred Language    1945 Male White Non-/Non  English      Problem List as of 10/6/2017  Date Reviewed: 6/6/2017                Chronic pain    Right upper quadrant abdominal pain    Diaphoresis    Fracture of four ribs    Diabetic peripheral neuropathy ([address]    Cancer of ureter ([address]    Chronic peripheral venous hypertension    Morbid obesity ([address]    Controlled substance agreement signed    CAD (coronary artery disease)    Appendicitis    CRF (chronic renal failure)    Type 2 diabetes mellitus ([address]    Renal cancer ([address]      Immunizations as of 10/6/2017     Name Date    Influenza, High Dose 10/31/2016, 9/30/2015, 9/25/2014, 9/30/2013, 9/28/2012, 10/17/2011    Pneumococcal 13-valent Conjugate (Sbkvdll40) 8/4/2015    Pneumococcal Polysaccharide (Npqksiclf01) 10/24/2011, 10/1/1996    Td 5/8/2010      Preventive Care        Date Due    Zoster Vaccine 6/4/2004    Tetanus Combination Vaccine (1 - Tdap) 5/9/2010    Diabetic Foot Exam 11/18/2015    Eye Exam By An Eye Doctor 11/18/2015 Colonoscopy 4/25/2017    Yearly Flu Vaccine (1) 9/1/2017    Urine Check For Kidney Problems 12/16/2017    Cholesterol Screening 7/11/2018    Hemoglobin A1C (Test For Long-Term Glucose Control) 9/22/2018            MyChart Signup           Our records indicate that you have an active [employer] account. You can view your After Visit Summary by going to [email or web address] and logging in with your [employer] username and password. If you don't have a [employer] username and password but a parent or guardian has access to your record, the parent or guardian should login with their own [employer] username and password and access your record to view the After Visit Summary. Additional Information  If you have questions, please contact the physician practice where you receive care. Remember, [employer] is NOT to be used for urgent needs. For medical emergencies, dial 911. For questions regarding your [employer] account call [phone]. If you have a clinical question, please call your doctor's office.

## 2017-10-19 ENCOUNTER — CARE COORDINATION (OUTPATIENT)
Dept: CARE COORDINATION | Age: 72
End: 2017-10-19

## 2017-10-19 RX ORDER — SYRING-NEEDL,DISP,INSUL,0.3 ML 30 G X1/2"
SYRINGE, EMPTY DISPOSABLE MISCELLANEOUS
Qty: 180 EACH | Refills: 3 | Status: SHIPPED | OUTPATIENT
Start: 2017-10-19 | End: 2018-07-27 | Stop reason: SDUPTHER

## 2017-10-19 RX ORDER — SYRINGE-NEEDLE,INSULIN,0.5 ML 30 G X1/2"
SYRINGE, EMPTY DISPOSABLE MISCELLANEOUS
Qty: 180 EACH | Refills: 3 | Status: SHIPPED | OUTPATIENT
Start: 2017-10-19 | End: 2018-12-06 | Stop reason: SDUPTHER

## 2017-10-19 RX ORDER — OMEPRAZOLE 40 MG/1
CAPSULE, DELAYED RELEASE ORAL
Qty: 90 CAPSULE | Refills: 3 | Status: SHIPPED | OUTPATIENT
Start: 2017-10-19 | End: 2018-10-12 | Stop reason: SDUPTHER

## 2017-11-03 ENCOUNTER — TELEPHONE (OUTPATIENT)
Dept: INTERNAL MEDICINE CLINIC | Age: 72
End: 2017-11-03

## 2017-11-03 DIAGNOSIS — E78.2 MIXED HYPERLIPIDEMIA: ICD-10-CM

## 2017-11-03 DIAGNOSIS — I10 ESSENTIAL HYPERTENSION: ICD-10-CM

## 2017-11-03 DIAGNOSIS — Z79.4 TYPE 2 DIABETES MELLITUS WITHOUT COMPLICATION, WITH LONG-TERM CURRENT USE OF INSULIN (HCC): Primary | ICD-10-CM

## 2017-11-03 DIAGNOSIS — E11.9 TYPE 2 DIABETES MELLITUS WITHOUT COMPLICATION, WITH LONG-TERM CURRENT USE OF INSULIN (HCC): Primary | ICD-10-CM

## 2017-11-06 RX ORDER — OXYCODONE HYDROCHLORIDE AND ACETAMINOPHEN 5; 325 MG/1; MG/1
1 TABLET ORAL EVERY 8 HOURS PRN
Qty: 120 TABLET | Refills: 0 | Status: SHIPPED | OUTPATIENT
Start: 2017-11-06 | End: 2017-12-12 | Stop reason: SDUPTHER

## 2017-11-06 NOTE — LETTER
others. I will keep all medications away from children. · I agree to participate in any medical, psychological or psychiatric assessments recommended by my provider. · I will actively participate in any program designed to improve function, including social, physical, psychological and daily or work activities. 2. I will not use illegal or street drugs or another person's prescription. If I have an addiction problem with drugs or alcohol and my provider asks me to enter a program to address this issue, I agree to follow through. Such programs may include:  · 12-Step program and securing a sponsor  · Individual counseling   · Inpatient or outpatient treatment  · Other:_____________________________________________________________________________________________________________________________________________    If in treatment, I will request that a copy of the programs initial evaluation and treatment recommendations be sent to this provider and will not expect refills until that is received. I will also request written monthly updates be sent to this provider to verify my continuing treatment. 3. I will consent to drug screening upon my providers request to assure I am only taking the prescribed drugs, described in this MEDICATION AGREEMENT. I understand that a drug screen is a laboratory test in which a sample of my urine, blood or saliva is checked to see what drugs I have been taking. 4. I agree that I will treat the providers and staff at this office with respect at all times. I will keep all of my scheduled appointments, but if I need to cancel my appointment, I will do so a minimum of 24 hours before it is scheduled. 5. I understand that this provider may stop prescribing the medications listed if:  · I do not show any improvement in pain, or my activity has not improved. · I develop rapid tolerance or loss of improvement, as described in my treatment plan. · I develop significant side effects from the medication. · My behavior is inconsistent with the responsibilities outlined above, which may also result in my being prevented from receiving further care from this office. · Other:____________________________________________________________________    AGREEMENT:    I have read the above and have had all of my questions answered. For chronic disease management, I know that my symptoms can be managed with many types of treatments. A chronic medication trial may be part of my treatment, but I must be an active participant in my care. Medication therapy is only one part of my symptom management plan. In some cases, there may be limited scientific evidence to support the chronic use of certain medications to improve symptoms and daily function. Furthermore, in certain circumstances, there may be scientific information that suggests that use of chronic controlled substances may actually worsen my symptoms and increase my risk of unintentional death directly related to this medication therapy. I know that if my provider feels my risk from controlled medications is greater than my benefit, I will have my controlled substance medication(s) compassionately lowered or removed altogether. I agree to a controlled substance medication trial.      I further agree to allow this office to contact family or friends if there are concerns about my safety and use of the controlled medications. I have agreed to use the following medications above as instructed by my physician and as stated in this [address].      Patient Signature:  ______________________  Date:11/6/2017 or _____________    Provider Signature:______________________  Date:11/6/2017 or _____________

## 2017-11-08 LAB
ALBUMIN SERPL-MCNC: 4 G/DL (ref 3.4–5)
ALP BLD-CCNC: 102 U/L (ref 40–129)
ALT SERPL-CCNC: 11 U/L (ref 10–40)
ANION GAP SERPL CALCULATED.3IONS-SCNC: 17 MMOL/L (ref 3–16)
AST SERPL-CCNC: 17 U/L (ref 15–37)
BASOPHILS ABSOLUTE: 0 K/UL (ref 0–0.2)
BASOPHILS RELATIVE PERCENT: 0.5 %
BILIRUB SERPL-MCNC: 0.6 MG/DL (ref 0–1)
BILIRUBIN DIRECT: <0.2 MG/DL (ref 0–0.3)
BILIRUBIN, INDIRECT: ABNORMAL MG/DL (ref 0–1)
BUN BLDV-MCNC: 23 MG/DL (ref 7–20)
CALCIUM SERPL-MCNC: 8.9 MG/DL (ref 8.3–10.6)
CHLORIDE BLD-SCNC: 92 MMOL/L (ref 99–110)
CHOLESTEROL, TOTAL: 175 MG/DL (ref 0–199)
CO2: 28 MMOL/L (ref 21–32)
CREAT SERPL-MCNC: 1.5 MG/DL (ref 0.8–1.3)
EOSINOPHILS ABSOLUTE: 0.1 K/UL (ref 0–0.6)
EOSINOPHILS RELATIVE PERCENT: 1.9 %
GFR AFRICAN AMERICAN: 55
GFR NON-AFRICAN AMERICAN: 46
GLUCOSE BLD-MCNC: 330 MG/DL (ref 70–99)
HCT VFR BLD CALC: 46.8 % (ref 40.5–52.5)
HDLC SERPL-MCNC: 81 MG/DL (ref 40–60)
HEMOGLOBIN: 15.5 G/DL (ref 13.5–17.5)
LDL CHOLESTEROL CALCULATED: 74 MG/DL
LYMPHOCYTES ABSOLUTE: 1.2 K/UL (ref 1–5.1)
LYMPHOCYTES RELATIVE PERCENT: 22.3 %
MCH RBC QN AUTO: 31.1 PG (ref 26–34)
MCHC RBC AUTO-ENTMCNC: 33.2 G/DL (ref 31–36)
MCV RBC AUTO: 93.7 FL (ref 80–100)
MONOCYTES ABSOLUTE: 0.6 K/UL (ref 0–1.3)
MONOCYTES RELATIVE PERCENT: 10.8 %
NEUTROPHILS ABSOLUTE: 3.5 K/UL (ref 1.7–7.7)
NEUTROPHILS RELATIVE PERCENT: 64.5 %
PDW BLD-RTO: 14.2 % (ref 12.4–15.4)
PLATELET # BLD: 201 K/UL (ref 135–450)
PMV BLD AUTO: 8.1 FL (ref 5–10.5)
POTASSIUM SERPL-SCNC: 4 MMOL/L (ref 3.5–5.1)
RBC # BLD: 4.99 M/UL (ref 4.2–5.9)
SODIUM BLD-SCNC: 137 MMOL/L (ref 136–145)
TOTAL CK: 116 U/L (ref 39–308)
TOTAL PROTEIN: 6.3 G/DL (ref 6.4–8.2)
TRIGL SERPL-MCNC: 99 MG/DL (ref 0–150)
VLDLC SERPL CALC-MCNC: 20 MG/DL
WBC # BLD: 5.4 K/UL (ref 4–11)

## 2017-11-09 LAB
ESTIMATED AVERAGE GLUCOSE: 177.2 MG/DL
HBA1C MFR BLD: 7.8 %

## 2017-11-10 RX ORDER — TRAZODONE HYDROCHLORIDE 100 MG/1
TABLET ORAL
Qty: 90 TABLET | Refills: 3 | Status: SHIPPED | OUTPATIENT
Start: 2017-11-10 | End: 2018-10-28 | Stop reason: SDUPTHER

## 2017-11-10 RX ORDER — POTASSIUM CHLORIDE 20 MEQ/1
TABLET, EXTENDED RELEASE ORAL
Qty: 180 TABLET | Refills: 3 | Status: SHIPPED | OUTPATIENT
Start: 2017-11-10 | End: 2018-10-28 | Stop reason: SDUPTHER

## 2017-11-21 ENCOUNTER — OFFICE VISIT (OUTPATIENT)
Dept: INTERNAL MEDICINE CLINIC | Age: 72
End: 2017-11-21

## 2017-11-21 VITALS
WEIGHT: 224.6 LBS | RESPIRATION RATE: 16 BRPM | HEART RATE: 68 BPM | SYSTOLIC BLOOD PRESSURE: 130 MMHG | DIASTOLIC BLOOD PRESSURE: 70 MMHG | BODY MASS INDEX: 38.55 KG/M2

## 2017-11-21 DIAGNOSIS — I87.2 VENOUS INSUFFICIENCY OF BOTH LOWER EXTREMITIES: ICD-10-CM

## 2017-11-21 DIAGNOSIS — Z79.4 TYPE 2 DIABETES MELLITUS WITH STAGE 3 CHRONIC KIDNEY DISEASE, WITH LONG-TERM CURRENT USE OF INSULIN (HCC): Primary | ICD-10-CM

## 2017-11-21 DIAGNOSIS — N18.30 TYPE 2 DIABETES MELLITUS WITH STAGE 3 CHRONIC KIDNEY DISEASE, WITH LONG-TERM CURRENT USE OF INSULIN (HCC): Primary | ICD-10-CM

## 2017-11-21 DIAGNOSIS — I10 ESSENTIAL HYPERTENSION: ICD-10-CM

## 2017-11-21 DIAGNOSIS — E11.22 TYPE 2 DIABETES MELLITUS WITH STAGE 3 CHRONIC KIDNEY DISEASE, WITH LONG-TERM CURRENT USE OF INSULIN (HCC): Primary | ICD-10-CM

## 2017-11-21 DIAGNOSIS — E78.2 MIXED HYPERLIPIDEMIA: ICD-10-CM

## 2017-11-21 PROCEDURE — 99213 OFFICE O/P EST LOW 20 MIN: CPT | Performed by: INTERNAL MEDICINE

## 2017-11-21 RX ORDER — LORAZEPAM 0.5 MG/1
0.5 TABLET ORAL NIGHTLY PRN
Qty: 90 TABLET | Refills: 0 | Status: SHIPPED | OUTPATIENT
Start: 2017-11-21 | End: 2018-03-12 | Stop reason: SDUPTHER

## 2017-12-07 ENCOUNTER — OFFICE VISIT (OUTPATIENT)
Dept: CARDIOLOGY CLINIC | Age: 72
End: 2017-12-07

## 2017-12-07 VITALS
DIASTOLIC BLOOD PRESSURE: 80 MMHG | HEIGHT: 64 IN | HEART RATE: 62 BPM | SYSTOLIC BLOOD PRESSURE: 150 MMHG | BODY MASS INDEX: 38.68 KG/M2 | WEIGHT: 226.6 LBS

## 2017-12-07 DIAGNOSIS — I25.10 ASHD (ARTERIOSCLEROTIC HEART DISEASE): Primary | ICD-10-CM

## 2017-12-07 PROCEDURE — 99213 OFFICE O/P EST LOW 20 MIN: CPT | Performed by: INTERNAL MEDICINE

## 2017-12-07 RX ORDER — NITROGLYCERIN 0.4 MG/1
0.4 TABLET SUBLINGUAL EVERY 5 MIN PRN
Qty: 50 TABLET | Refills: 1 | Status: SHIPPED | OUTPATIENT
Start: 2017-12-07 | End: 2018-09-11 | Stop reason: SDUPTHER

## 2017-12-07 NOTE — PROGRESS NOTES
CARDIOLOGY NOTE      12/7/2017    RE: Vernell Valenzuela  ([birth year])                               TO:  Dr. Rciky Kolb MD      Thank you for involving me in taking care of your  patient Vernell Valenzuela, who is a  67y.o. year old      male with past medical  history of  Cad, htn, hyperlipidimea, dm  is  seen today Patient  during this  visit has upset stomach, no CP. Vitals:    12/07/17 1028   BP: (!) 150/80   Pulse:        Current Outpatient Prescriptions   Medication Sig Dispense Refill    LORazepam (ATIVAN) 0.5 MG tablet Take 1 tablet by mouth nightly as needed for Anxiety . 90 tablet 0    traZODone (DESYREL) 100 MG tablet TAKE 1 TABLET NIGHTLY 90 tablet 3    oxyCODONE-acetaminophen (PERCOCET) 5-325 MG per tablet Take 1 tablet by mouth every 8 hours as needed for Pain  May go to 1 every 6 hrs as needed for extreme pain. [facility] [place]: [place]17 120 tablet 0    HUMALOG 100 UNIT/ML injection vial INJECT 6 TO 8 UNITS (VARIES) WITH MEALS TWICE A DAY 30 mL 3    B-D INS SYR ULTRAFINE .3CC/30G 30G X 1/2\" 0.3 ML MISC INJECT AND USE TWICE A  each 3    carbidopa-levodopa (SINEMET)  MG per tablet TAKE ONE AND ONE-HALF TABLETS NIGHTLY 135 tablet 3    B-D INS SYR ULTRAFINE .5CC/30G 30G X 1/2\" 0.5 ML MISC USE TWICE A DAY OR AS NEEDED AS DIRECTED 180 each 3    omeprazole (PRILOSEC) 40 MG delayed release capsule TAKE 1 CAPSULE DAILY 90 capsule 3    CARTIA  MG extended release capsule TAKE 1 CAPSULE DAILY 90 capsule 3    allopurinol (ZYLOPRIM) 100 MG tablet TAKE 1 TABLET DAILY FOR ELEVATED URIC ACID 90 tablet 3    hydrochlorothiazide (HYDRODIURIL) 25 MG tablet TAKE 1 TABLET DAILY 90 tablet 3    metolazone (ZAROXOLYN) 2.5 MG tablet Take one tablet once a week. Take 30 minutes before the Lasix.  4 tablet 1    atorvastatin (LIPITOR) 20 MG tablet TAKE 1 TABLET DAILY 90 tablet 3    sertraline (ZOLOFT) 50 MG tablet TAKE 2 TABLETS DAILY 180 tablet 3    furosemide (LASIX) 40 MG lispro (HUMALOG) 100 UNIT/ML injection Inject 5 units in the am & pm 3 vial 3    docusate sodium (COLACE) 100 MG capsule Take 100 mg by mouth 2 times daily       potassium chloride (KLOR-CON M) 20 MEQ extended release tablet TAKE 1 TABLET TWICE A  tablet 3     Current Facility-Administered Medications   Medication Dose Route Frequency Provider Last Rate Last Dose    cyanocobalamin injection 1,000 mcg  1,000 mcg Intramuscular Once Fleet Lefort, MD         Allergies: Shellfish-derived products and Iodides  Past Medical History:   Diagnosis Date    Anesthesia     \"Violent Vomiting After Anesthetic\"    ASHD (arteriosclerotic heart disease) 3/95    Sees Dr. Marion Chahal Once A Year    CAD (coronary artery disease) 1990    PTCA to LAD    Diabetes mellitus ([address] Dx 1985    Full dentures     H/O cardiovascular stress test 9/21/12 9/12-EF 70% WNL     H/O cardiovascular stress test 12/15/2014    cardiolite-normal, no ischemia, EF68%    H/O Doppler ultrasound 3/9/11    PERIPHERAL ARTERIAL 3/11-Essentially normal.     H/O Doppler ultrasound 5/18/10    CARTOID: 5/10-Mild to moderate ateriosclerotic plaquing involving the carotid bifurcation, similar in appearance to the previous study with no evidenc of flow-limiting stenosis.      H/O echocardiogram 3/9/11    3/11-EF >55% WNL     H/O kidney removal 3/12    Right    Heart murmur     [place] (hard of hearing)     Wears Bilateral Hearing Aids    Hx of echocardiogram 06/16/2017    EF 55-60 mod LV hypertrophy, Grade 1 diastolic dysfunction, mild aortic stenosis    Hyperlipidemia     Hypertension     Insomnia     Mitral regurgitation     Nausea & vomiting     \"Violent Vomiting After Anesthetic\"    Proteinuria     Restless leg syndrome     Right Kidney Cancer Dx 2-12    Staph infection 2000's    Post Op Back Surgery    Venous stasis ulcer of right lower extremity ([address] 9/2015    [facility] [address]     Past Surgical History:   Procedure Laterality Date  APPENDECTOMY  7/22/13    laprascopic    ATHERECTOMY  3/95    S/P ROTO OF LAD    BACK SURGERY  2000's    Staph Infection Post Op    COLONOSCOPY  2007    CORONARY ANGIOPLASTY  2000    One Stent    CYST REMOVAL  2005    Back Of Neck    DENTAL SURGERY  1960    All Teeth Extracted    ENDOSCOPY, COLON, DIAGNOSTIC  1990's    OTHER SURGICAL HISTORY  2005    Area Removed From  Back (Benign\"    TONSILLECTOMY AND ADENOIDECTOMY  1950    TOTAL NEPHRECTOMY  3/19/12    Robotic nephroureterectomy    UPPER GASTROINTESTINAL ENDOSCOPY  8/28/2014    gastric polyp    VASECTOMY  1980     Family History   Problem Relation Age of Onset    Early Death Father 59    Heart Disease Father     High Blood Pressure Father      Social History   Substance Use Topics    Smoking status: Former Smoker     Packs/day: 1.00     Years: 15.00     Quit date: 3/15/2000    Smokeless tobacco: Never Used      Comment: Reviewed 9/25/2015    Alcohol use No          Review of systems:  HEENT: Neg  Card:neg   GI; abd pain  : Neg  Neuro: Neg  Psych: Neg  Derm: Neg  MS; Neg  All: Documented  Constitutional: Neg    Objective:      Physical Exam:  BP (!) 150/80   Pulse 62   Ht 5' 4\" (1.626 m)   Wt 226 lb 9.6 oz (102.8 kg)   BMI 38.90 kg/m²   Wt Readings from Last 3 Encounters:   12/07/17 226 lb 9.6 oz (102.8 kg)   11/21/17 224 lb 9.6 oz (101.9 kg)   10/06/17 224 lb 3.2 oz (101.7 kg)     Body mass index is 38.9 kg/m². GENERAL - Alert, oriented, pleasant, in no apparent distress. Head unremarkable  Eyes  Not injected conjunctiva  ENT  normal mucosa  Neck - Supple. No jugular venous distention noted. No carotid bruits. Cardiovascular  Normal S1 and S2 with 2/6 NAN. Extremities - No cyanosis, clubbing, or significant edema. Pulmonary  No respiratory distress. No wheezes or rales.     Pulses: Bilateral radial and pedal pulses normal  Abdomen  no tenderness  Musculoskeletal  normal strength  Neurologic    There are  no gross focal neurologic abnormalities. Skin-  No rash  Affect; normal mood    DATA:  Lab Results   Component Value Date    CKTOTAL 116 11/08/2017     BNP:  No results found for: BNP  PT/INR:  No results found for: PTINR  Lab Results   Component Value Date    LABA1C 7.8 11/08/2017    LABA1C 8.0 09/22/2017     Lab Results   Component Value Date    CHOL 175 11/08/2017    TRIG 99 11/08/2017    HDL 81 (H) 11/08/2017    LDLCALC 74 11/08/2017     Lab Results   Component Value Date    ALT 11 11/08/2017    AST 17 11/08/2017     TSH:    Lab Results   Component Value Date    TSH 2.56 07/11/2017         QUALITY MEASURES:  CAD:  Yes   CHOL LOWERING:  Yes- if No Why  ANTIPLATELET:  Yes - if No why  BETA BLOCKER    yes,   IF  NO WHY  SMOKING HISTORY no COUNSELLED no  ATRIAL FIBRILLATIONno ANTICOAG: no    Assessment/ Plan:     Patient seen , interviewed and examined                -     CORONARY ARTERY DISEASE: Patient  currently as per anginal classification has   No symptoms           - changes in  treatment:   no  All CAD tests available in records reviewed. -  Hypertension: Patients blood pressure is  high. Patient is advised about low sodium diet. Present medical regimen will not be changed. -  LIPID MANAGEMENT:  Available lipid  lab data reviewed  and patient was given dietary advice. -   Changes  in medicines made: No           -   DIABETES MELLITUS: Available pertinent lab data reviewed   and  patient was given dietary advice      -   Changes  in medicines made:  No             - VHD  Mild AS

## 2017-12-12 RX ORDER — OXYCODONE HYDROCHLORIDE AND ACETAMINOPHEN 5; 325 MG/1; MG/1
1 TABLET ORAL EVERY 8 HOURS PRN
Qty: 120 TABLET | Refills: 0 | Status: SHIPPED | OUTPATIENT
Start: 2017-12-12 | End: 2018-01-29 | Stop reason: SDUPTHER

## 2017-12-12 NOTE — TELEPHONE ENCOUNTER
Controlled Substances Monitoring:     Attestation: The Prescription Monitoring Report for this patient was reviewed today. Trish Roque MD)  Documentation: No signs of potential drug abuse or diversion identified.  Trish Roque MD)

## 2017-12-26 RX ORDER — GABAPENTIN 300 MG/1
CAPSULE ORAL
Qty: 270 CAPSULE | Refills: 3 | Status: SHIPPED | OUTPATIENT
Start: 2017-12-26 | End: 2019-01-18 | Stop reason: SDUPTHER

## 2018-01-02 ENCOUNTER — OFFICE VISIT (OUTPATIENT)
Dept: INTERNAL MEDICINE CLINIC | Age: 73
End: 2018-01-02

## 2018-01-02 VITALS
SYSTOLIC BLOOD PRESSURE: 130 MMHG | BODY MASS INDEX: 38.79 KG/M2 | DIASTOLIC BLOOD PRESSURE: 72 MMHG | HEART RATE: 60 BPM | WEIGHT: 226 LBS

## 2018-01-02 DIAGNOSIS — E03.4 HYPOTHYROIDISM DUE TO ACQUIRED ATROPHY OF THYROID: ICD-10-CM

## 2018-01-02 DIAGNOSIS — D12.6 ADENOMATOUS POLYP OF COLON, UNSPECIFIED PART OF COLON: ICD-10-CM

## 2018-01-02 DIAGNOSIS — N18.30 CRF (CHRONIC RENAL FAILURE), STAGE 3 (MODERATE) (HCC): ICD-10-CM

## 2018-01-02 DIAGNOSIS — E11.65 TYPE 2 DIABETES MELLITUS WITH HYPERGLYCEMIA, WITH LONG-TERM CURRENT USE OF INSULIN (HCC): Primary | ICD-10-CM

## 2018-01-02 DIAGNOSIS — Z79.4 TYPE 2 DIABETES MELLITUS WITH HYPERGLYCEMIA, WITH LONG-TERM CURRENT USE OF INSULIN (HCC): Primary | ICD-10-CM

## 2018-01-02 DIAGNOSIS — I87.2 VENOUS INSUFFICIENCY OF BOTH LOWER EXTREMITIES: ICD-10-CM

## 2018-01-02 DIAGNOSIS — E66.01 MORBID OBESITY WITH BMI OF 40.0-44.9, ADULT (HCC): ICD-10-CM

## 2018-01-02 DIAGNOSIS — I10 ESSENTIAL HYPERTENSION: ICD-10-CM

## 2018-01-02 DIAGNOSIS — E55.9 VITAMIN D DEFICIENCY: ICD-10-CM

## 2018-01-02 DIAGNOSIS — E78.2 MIXED HYPERLIPIDEMIA: ICD-10-CM

## 2018-01-02 DIAGNOSIS — L84 PRE-ULCERATIVE CORN OR CALLOUS: ICD-10-CM

## 2018-01-02 LAB
A/G RATIO: 1.7 (ref 1.1–2.2)
ALBUMIN SERPL-MCNC: 4 G/DL (ref 3.4–5)
ALP BLD-CCNC: 92 U/L (ref 40–129)
ALT SERPL-CCNC: 15 U/L (ref 10–40)
ANION GAP SERPL CALCULATED.3IONS-SCNC: 14 MMOL/L (ref 3–16)
AST SERPL-CCNC: 19 U/L (ref 15–37)
BASOPHILS ABSOLUTE: 0 K/UL (ref 0–0.2)
BASOPHILS RELATIVE PERCENT: 0.9 %
BILIRUB SERPL-MCNC: 0.5 MG/DL (ref 0–1)
BILIRUBIN URINE: NEGATIVE
BLOOD, URINE: NEGATIVE
BUN BLDV-MCNC: 19 MG/DL (ref 7–20)
CALCIUM SERPL-MCNC: 8.8 MG/DL (ref 8.3–10.6)
CHLORIDE BLD-SCNC: 96 MMOL/L (ref 99–110)
CHOLESTEROL, TOTAL: 179 MG/DL (ref 0–199)
CLARITY: CLEAR
CO2: 29 MMOL/L (ref 21–32)
COLOR: YELLOW
CREAT SERPL-MCNC: 1.4 MG/DL (ref 0.8–1.3)
CREATININE URINE: 111.5 MG/DL (ref 39–259)
EOSINOPHILS ABSOLUTE: 0.2 K/UL (ref 0–0.6)
EOSINOPHILS RELATIVE PERCENT: 3.7 %
EPITHELIAL CELLS, UA: 1 /HPF (ref 0–5)
GFR AFRICAN AMERICAN: >60
GFR NON-AFRICAN AMERICAN: 50
GLOBULIN: 2.3 G/DL
GLUCOSE BLD-MCNC: 138 MG/DL (ref 70–99)
GLUCOSE URINE: NEGATIVE MG/DL
HCT VFR BLD CALC: 47.6 % (ref 40.5–52.5)
HDLC SERPL-MCNC: 82 MG/DL (ref 40–60)
HEMOGLOBIN: 15.7 G/DL (ref 13.5–17.5)
HYALINE CASTS: 0 /LPF (ref 0–8)
KETONES, URINE: NEGATIVE MG/DL
LDL CHOLESTEROL CALCULATED: 77 MG/DL
LEUKOCYTE ESTERASE, URINE: NEGATIVE
LYMPHOCYTES ABSOLUTE: 1.2 K/UL (ref 1–5.1)
LYMPHOCYTES RELATIVE PERCENT: 21.6 %
MCH RBC QN AUTO: 31.1 PG (ref 26–34)
MCHC RBC AUTO-ENTMCNC: 32.9 G/DL (ref 31–36)
MCV RBC AUTO: 94.6 FL (ref 80–100)
MICROALBUMIN UR-MCNC: 3 MG/DL
MICROALBUMIN/CREAT UR-RTO: 26.9 MG/G (ref 0–30)
MICROSCOPIC EXAMINATION: YES
MONOCYTES ABSOLUTE: 0.4 K/UL (ref 0–1.3)
MONOCYTES RELATIVE PERCENT: 7.8 %
NEUTROPHILS ABSOLUTE: 3.7 K/UL (ref 1.7–7.7)
NEUTROPHILS RELATIVE PERCENT: 66 %
NITRITE, URINE: NEGATIVE
PDW BLD-RTO: 13.8 % (ref 12.4–15.4)
PH UA: 6.5
PLATELET # BLD: 214 K/UL (ref 135–450)
PMV BLD AUTO: 8 FL (ref 5–10.5)
POTASSIUM SERPL-SCNC: 4 MMOL/L (ref 3.5–5.1)
PROTEIN UA: ABNORMAL MG/DL
RBC # BLD: 5.03 M/UL (ref 4.2–5.9)
RBC UA: 3 /HPF (ref 0–4)
SODIUM BLD-SCNC: 139 MMOL/L (ref 136–145)
SPECIFIC GRAVITY UA: 1.02
TOTAL CK: 78 U/L (ref 39–308)
TOTAL PROTEIN: 6.3 G/DL (ref 6.4–8.2)
TRIGL SERPL-MCNC: 101 MG/DL (ref 0–150)
TSH SERPL DL<=0.05 MIU/L-ACNC: 3.27 UIU/ML (ref 0.27–4.2)
UROBILINOGEN, URINE: 1 E.U./DL
VITAMIN D 25-HYDROXY: 52.7 NG/ML
VLDLC SERPL CALC-MCNC: 20 MG/DL
WBC # BLD: 5.6 K/UL (ref 4–11)
WBC UA: 2 /HPF (ref 0–5)

## 2018-01-02 PROCEDURE — 81001 URINALYSIS AUTO W/SCOPE: CPT | Performed by: INTERNAL MEDICINE

## 2018-01-02 PROCEDURE — 99215 OFFICE O/P EST HI 40 MIN: CPT | Performed by: INTERNAL MEDICINE

## 2018-01-02 PROCEDURE — 36415 COLL VENOUS BLD VENIPUNCTURE: CPT | Performed by: INTERNAL MEDICINE

## 2018-01-02 PROCEDURE — 82272 OCCULT BLD FECES 1-3 TESTS: CPT | Performed by: INTERNAL MEDICINE

## 2018-01-02 NOTE — PROGRESS NOTES
Comprehensive Exam      PI:        The patient is a 67year old white male who presents for his comprehensive exam. He has a history of diabetes mellitus on insulin therapy. His glucoses have been labile with hypoglycemic episodes but his Hgba1c have been stable. He has been receiving epidural steroid injections for his lumbar spinal stenosis and his glucoses have been elevated. During the injections he has a sliding Humalog scale that he uses. There is a history of ASHD status post artherectomy of LAD in 3/95. No headache, chest pain, or breathing difficulties. He is seeing his cardiologist on a regular basis. He completed a Lexiscan on 10/5/16 that was negative for ischemia with an EF of 68%. He sees Dr Diandra Cabral on a yearly basis for his diabetic eye exam. He is treating his glaucoma. No evidence for diabetic retinopathy has been reported. He is having persistent lower back pain that radiated to both legs. He has been on medications and physical therapy with no improvement. No fever or chills. No bowel or bladder dysfunction. He has completed a lumbar MRI showing spinal stenosis. He  is receiving  lumbar epidural steroid injections with good results. He had a screening colonoscopy with Dr Darylene Fam in 4/07 where a hyperplastic polyp was removed but the prep was poor. A follow up barium enema was reported normal. A five year follow up was recommended. On 3/2012 he underwent a right nephrectomy for a nephroma per Dr Ness Blue. Doing well since then. PMH:    History of diabetes mellitus, hyperlipidemia, ASHD, HTN, nephroma, and proteinuria. No history of pulmonary disorder. Surgical history of a L3-4 laminectomy by Dr Cortez Ayers, appendectomy, vasectomy, and right nephrectomy.        MED:   Current Outpatient Prescriptions   Medication Sig Dispense Refill    gabapentin (NEURONTIN) 300 MG capsule TAKE 1 CAPSULE IN THE AFTERNOON AND 2 CAPSULES AT BEDTIME 270 capsule 3    day    FH: Mother  age [de-identified] of broken hip complications. Father  age 77 of ASHD. ROS: General:  No weight loss or anorexia. No fever, chills, or night sweats. Head:       No headache, voice change, hoarseness, or swallowing difficulties            Resp:       No wheezing, coughing, or hemoptysis            CV:           No rest or exertional chest pain. No orthopnea or PND. He has noted palpitations. GI:            No abdominal pain, change in bowel habits, hematochezia, or melanotic stool. :          No frequency, urgency, dysuria, or hematuria. Neuro:     No symptoms of cranial nerve dysfunction, gait difficulties, or extremity weakness. Immun:    Up to date on influenza, TD, pneumovax & Prevnar. On Steroid injections every 3 months, holding Zostrix       PE:    Vitals:      Blood pressure 130/72, pulse 60, weight 226 lb (102.5 kg). GEN:       No acute distress, alert and oriented x3            HENNT:  Bilateral cerumen impaction. Pupils equal and reactive to light. EOMs intact. Fundi are clear and discs are flat. Oropharynx is clear. No facial rash or lesions. NECK:     Supple without palpable lymph nodes. Thyroid exam is normal.            LUNGS:   Clear to auscultation. CV:          Regular pulse. Grade one systolic murmur. Carotid bruits present. Axilla:      No palpable nodes. ABD:       Bowel sounds normal. There is no distention. No abdominal bruits. No tenderness, guarding, rebound, masses, or organomegaly. NATALIYA:      No hernia or palpable lymphadenopathy. RECT:    Deferred, recently completed by Dr Enrico Hansen            EXT:       Bilateral lower ext edema.  Distal pulses are normal.            SKIN:      No abnormal skin

## 2018-01-03 LAB
ESTIMATED AVERAGE GLUCOSE: 185.8 MG/DL
HBA1C MFR BLD: 8.1 %

## 2018-01-15 RX ORDER — INSULIN HUMAN 100 [IU]/ML
INJECTION, SUSPENSION SUBCUTANEOUS
Qty: 50 ML | Refills: 3 | Status: SHIPPED | OUTPATIENT
Start: 2018-01-15 | End: 2019-05-23 | Stop reason: SDUPTHER

## 2018-01-24 ENCOUNTER — TELEPHONE (OUTPATIENT)
Dept: INTERNAL MEDICINE CLINIC | Age: 73
End: 2018-01-24

## 2018-01-26 ENCOUNTER — OFFICE VISIT (OUTPATIENT)
Dept: INTERNAL MEDICINE CLINIC | Age: 73
End: 2018-01-26

## 2018-01-26 VITALS
RESPIRATION RATE: 20 BRPM | BODY MASS INDEX: 38.79 KG/M2 | DIASTOLIC BLOOD PRESSURE: 85 MMHG | SYSTOLIC BLOOD PRESSURE: 135 MMHG | HEART RATE: 72 BPM | WEIGHT: 226 LBS

## 2018-01-26 DIAGNOSIS — Z79.4 TYPE 2 DIABETES MELLITUS WITH HYPERGLYCEMIA, WITH LONG-TERM CURRENT USE OF INSULIN (HCC): Primary | ICD-10-CM

## 2018-01-26 DIAGNOSIS — E78.2 MIXED HYPERLIPIDEMIA: ICD-10-CM

## 2018-01-26 DIAGNOSIS — Z12.11 COLON CANCER SCREENING: ICD-10-CM

## 2018-01-26 DIAGNOSIS — I10 ESSENTIAL HYPERTENSION: ICD-10-CM

## 2018-01-26 DIAGNOSIS — M48.061 SPINAL STENOSIS OF LUMBAR REGION WITHOUT NEUROGENIC CLAUDICATION: ICD-10-CM

## 2018-01-26 DIAGNOSIS — E11.65 TYPE 2 DIABETES MELLITUS WITH HYPERGLYCEMIA, WITH LONG-TERM CURRENT USE OF INSULIN (HCC): Primary | ICD-10-CM

## 2018-01-26 PROCEDURE — 99213 OFFICE O/P EST LOW 20 MIN: CPT | Performed by: INTERNAL MEDICINE

## 2018-01-29 DIAGNOSIS — M48.061 SPINAL STENOSIS OF LUMBAR REGION WITHOUT NEUROGENIC CLAUDICATION: Primary | ICD-10-CM

## 2018-01-29 RX ORDER — OXYCODONE HYDROCHLORIDE AND ACETAMINOPHEN 5; 325 MG/1; MG/1
1 TABLET ORAL EVERY 8 HOURS PRN
Qty: 120 TABLET | Refills: 0 | Status: SHIPPED | OUTPATIENT
Start: 2018-01-29 | End: 2018-03-16 | Stop reason: SDUPTHER

## 2018-01-29 NOTE — TELEPHONE ENCOUNTER
Controlled Substances Monitoring:     Attestation: The Prescription Monitoring Report for this patient was reviewed today. Elier Frank MD)  Documentation: No signs of potential drug abuse or diversion identified.  Elier Frank MD)

## 2018-02-06 NOTE — PROGRESS NOTES
S: Patient presents with problems of CRF, Diabetes mellitus, ASHD, HTN, and lumbar spinal stenosis. He is currently receiving lumbar epidural steroid injections and is on chronic pain medication for his lumbar spinal stenosis. For his diabetes glucoses at home have been less than 140. He is using a sliding scale when he receives to steroid injections. He has just received an epidural steroid injection with elevated glucoses. He is now using weekly Zaroxolyn with his daily lasix with control of his lower ext edema. . No orthopnea or PND. No falls. His Wife is present. O:Blood pressure 135/85, pulse 72, resp. rate 20, weight 226 lb (102.5 kg). Neck: No palpable lymph nodes, normal thyroid examination.       Lungs: clear      Cardio: reg pulse, grade I systolic murmur      Abd: non tender      Back: tenderness and spasms of lumbar paraspinal muscles      Ext: bilateral lower ext pitting edema has improved, no ulcers          Labs: from 1/2/18 CBC normal, LDL 77 HDL 82 Trig 101, Hgba1c 8.1%, Lytes normal BUN 19 Creat 1.4, Glucose 138, Liver tests normal, UA normal, TSH 3.27, CK 78, Vit D 53        A: venous insufficiency of lower exts improved      Hyperlipidemia on Lipitor      CRF stable      Diabetes mellitus not controlled with recent epidural steroid injection       Lumbar spinal stenosis with chronic pain       Hypertension controlled    P: copy of labs given        Sliding Humalog scale to improve glucose control        Cologuard  Order        Continue low carb low fat diet       OV in 2M wih Basic chem Hgba1c lipid liver cpk cbc       H&P one year

## 2018-03-12 RX ORDER — LORAZEPAM 0.5 MG/1
0.5 TABLET ORAL NIGHTLY PRN
Qty: 90 TABLET | Refills: 0 | Status: SHIPPED | OUTPATIENT
Start: 2018-03-12 | End: 2018-09-04 | Stop reason: SDUPTHER

## 2018-03-16 DIAGNOSIS — I10 ESSENTIAL HYPERTENSION: ICD-10-CM

## 2018-03-16 DIAGNOSIS — Z79.4 TYPE 2 DIABETES MELLITUS WITH HYPERGLYCEMIA, WITH LONG-TERM CURRENT USE OF INSULIN (HCC): ICD-10-CM

## 2018-03-16 DIAGNOSIS — E11.65 TYPE 2 DIABETES MELLITUS WITH HYPERGLYCEMIA, WITH LONG-TERM CURRENT USE OF INSULIN (HCC): ICD-10-CM

## 2018-03-16 DIAGNOSIS — E78.2 MIXED HYPERLIPIDEMIA: ICD-10-CM

## 2018-03-16 DIAGNOSIS — M48.061 SPINAL STENOSIS OF LUMBAR REGION WITHOUT NEUROGENIC CLAUDICATION: ICD-10-CM

## 2018-03-16 DIAGNOSIS — N18.30 CRF (CHRONIC RENAL FAILURE), STAGE 3 (MODERATE) (HCC): ICD-10-CM

## 2018-03-16 DIAGNOSIS — E11.65 TYPE 2 DIABETES MELLITUS WITH HYPERGLYCEMIA, WITH LONG-TERM CURRENT USE OF INSULIN (HCC): Primary | ICD-10-CM

## 2018-03-16 DIAGNOSIS — Z79.4 TYPE 2 DIABETES MELLITUS WITH HYPERGLYCEMIA, WITH LONG-TERM CURRENT USE OF INSULIN (HCC): Primary | ICD-10-CM

## 2018-03-16 LAB
ALBUMIN SERPL-MCNC: 4.5 G/DL (ref 3.4–5)
ALP BLD-CCNC: 87 U/L (ref 40–129)
ALT SERPL-CCNC: 33 U/L (ref 10–40)
ANION GAP SERPL CALCULATED.3IONS-SCNC: 20 MMOL/L (ref 3–16)
AST SERPL-CCNC: 27 U/L (ref 15–37)
BASOPHILS ABSOLUTE: 0 K/UL (ref 0–0.2)
BASOPHILS RELATIVE PERCENT: 0.6 %
BILIRUB SERPL-MCNC: 0.5 MG/DL (ref 0–1)
BILIRUBIN DIRECT: <0.2 MG/DL (ref 0–0.3)
BILIRUBIN, INDIRECT: NORMAL MG/DL (ref 0–1)
BUN BLDV-MCNC: 12 MG/DL (ref 7–20)
CALCIUM SERPL-MCNC: 9.2 MG/DL (ref 8.3–10.6)
CHLORIDE BLD-SCNC: 100 MMOL/L (ref 99–110)
CHOLESTEROL, TOTAL: 165 MG/DL (ref 0–199)
CO2: 23 MMOL/L (ref 21–32)
CREAT SERPL-MCNC: 0.5 MG/DL (ref 0.8–1.3)
EOSINOPHILS ABSOLUTE: 0.2 K/UL (ref 0–0.6)
EOSINOPHILS RELATIVE PERCENT: 3.8 %
GFR AFRICAN AMERICAN: >60
GFR NON-AFRICAN AMERICAN: >60
GLUCOSE BLD-MCNC: 179 MG/DL (ref 70–99)
HCT VFR BLD CALC: 43.9 % (ref 40.5–52.5)
HDLC SERPL-MCNC: 53 MG/DL (ref 40–60)
HEMOGLOBIN: 15.1 G/DL (ref 13.5–17.5)
LDL CHOLESTEROL CALCULATED: 70 MG/DL
LYMPHOCYTES ABSOLUTE: 1.3 K/UL (ref 1–5.1)
LYMPHOCYTES RELATIVE PERCENT: 22.6 %
MCH RBC QN AUTO: 31.7 PG (ref 26–34)
MCHC RBC AUTO-ENTMCNC: 34.3 G/DL (ref 31–36)
MCV RBC AUTO: 92.2 FL (ref 80–100)
MONOCYTES ABSOLUTE: 0.6 K/UL (ref 0–1.3)
MONOCYTES RELATIVE PERCENT: 10.4 %
NEUTROPHILS ABSOLUTE: 3.7 K/UL (ref 1.7–7.7)
NEUTROPHILS RELATIVE PERCENT: 62.6 %
PDW BLD-RTO: 13.4 % (ref 12.4–15.4)
PLATELET # BLD: 185 K/UL (ref 135–450)
PMV BLD AUTO: 8 FL (ref 5–10.5)
POTASSIUM SERPL-SCNC: 4.7 MMOL/L (ref 3.5–5.1)
RBC # BLD: 4.75 M/UL (ref 4.2–5.9)
SODIUM BLD-SCNC: 143 MMOL/L (ref 136–145)
TOTAL CK: 87 U/L (ref 39–308)
TOTAL PROTEIN: 6.8 G/DL (ref 6.4–8.2)
TRIGL SERPL-MCNC: 209 MG/DL (ref 0–150)
VLDLC SERPL CALC-MCNC: 42 MG/DL
WBC # BLD: 5.8 K/UL (ref 4–11)

## 2018-03-16 RX ORDER — OXYCODONE HYDROCHLORIDE AND ACETAMINOPHEN 5; 325 MG/1; MG/1
1 TABLET ORAL EVERY 8 HOURS PRN
Qty: 120 TABLET | Refills: 0 | Status: SHIPPED | OUTPATIENT
Start: 2018-03-16 | End: 2018-04-30 | Stop reason: SDUPTHER

## 2018-03-17 LAB
ESTIMATED AVERAGE GLUCOSE: 180 MG/DL
HBA1C MFR BLD: 7.9 %

## 2018-03-26 ENCOUNTER — OFFICE VISIT (OUTPATIENT)
Dept: INTERNAL MEDICINE CLINIC | Age: 73
End: 2018-03-26

## 2018-03-26 VITALS
HEART RATE: 72 BPM | SYSTOLIC BLOOD PRESSURE: 130 MMHG | BODY MASS INDEX: 38.62 KG/M2 | WEIGHT: 225 LBS | DIASTOLIC BLOOD PRESSURE: 72 MMHG | OXYGEN SATURATION: 95 %

## 2018-03-26 DIAGNOSIS — M48.061 SPINAL STENOSIS OF LUMBAR REGION WITHOUT NEUROGENIC CLAUDICATION: ICD-10-CM

## 2018-03-26 DIAGNOSIS — I10 ESSENTIAL HYPERTENSION: ICD-10-CM

## 2018-03-26 DIAGNOSIS — N18.30 CRF (CHRONIC RENAL FAILURE), STAGE 3 (MODERATE) (HCC): ICD-10-CM

## 2018-03-26 DIAGNOSIS — I87.2 VENOUS INSUFFICIENCY OF BOTH LOWER EXTREMITIES: ICD-10-CM

## 2018-03-26 DIAGNOSIS — Z79.4 TYPE 2 DIABETES MELLITUS WITH HYPERGLYCEMIA, WITH LONG-TERM CURRENT USE OF INSULIN (HCC): Primary | ICD-10-CM

## 2018-03-26 DIAGNOSIS — E11.65 TYPE 2 DIABETES MELLITUS WITH HYPERGLYCEMIA, WITH LONG-TERM CURRENT USE OF INSULIN (HCC): Primary | ICD-10-CM

## 2018-03-26 DIAGNOSIS — E78.2 MIXED HYPERLIPIDEMIA: ICD-10-CM

## 2018-03-26 PROCEDURE — 99214 OFFICE O/P EST MOD 30 MIN: CPT | Performed by: INTERNAL MEDICINE

## 2018-04-30 DIAGNOSIS — M48.061 SPINAL STENOSIS OF LUMBAR REGION WITHOUT NEUROGENIC CLAUDICATION: ICD-10-CM

## 2018-04-30 RX ORDER — OXYCODONE HYDROCHLORIDE AND ACETAMINOPHEN 5; 325 MG/1; MG/1
1 TABLET ORAL EVERY 8 HOURS PRN
Qty: 120 TABLET | Refills: 0 | Status: SHIPPED | OUTPATIENT
Start: 2018-04-30 | End: 2018-06-01 | Stop reason: SDUPTHER

## 2018-05-09 DIAGNOSIS — E03.4 HYPOTHYROIDISM DUE TO ACQUIRED ATROPHY OF THYROID: ICD-10-CM

## 2018-05-09 DIAGNOSIS — E11.65 TYPE 2 DIABETES MELLITUS WITH HYPERGLYCEMIA, WITH LONG-TERM CURRENT USE OF INSULIN (HCC): Primary | ICD-10-CM

## 2018-05-09 DIAGNOSIS — I10 ESSENTIAL HYPERTENSION: ICD-10-CM

## 2018-05-09 DIAGNOSIS — E78.2 MIXED HYPERLIPIDEMIA: ICD-10-CM

## 2018-05-09 DIAGNOSIS — Z79.4 TYPE 2 DIABETES MELLITUS WITH HYPERGLYCEMIA, WITH LONG-TERM CURRENT USE OF INSULIN (HCC): Primary | ICD-10-CM

## 2018-05-21 DIAGNOSIS — Z79.4 TYPE 2 DIABETES MELLITUS WITH HYPERGLYCEMIA, WITH LONG-TERM CURRENT USE OF INSULIN (HCC): ICD-10-CM

## 2018-05-21 DIAGNOSIS — E78.2 MIXED HYPERLIPIDEMIA: ICD-10-CM

## 2018-05-21 DIAGNOSIS — E11.65 TYPE 2 DIABETES MELLITUS WITH HYPERGLYCEMIA, WITH LONG-TERM CURRENT USE OF INSULIN (HCC): ICD-10-CM

## 2018-05-21 DIAGNOSIS — I10 ESSENTIAL HYPERTENSION: ICD-10-CM

## 2018-05-21 DIAGNOSIS — E03.4 HYPOTHYROIDISM DUE TO ACQUIRED ATROPHY OF THYROID: ICD-10-CM

## 2018-05-21 LAB
ALBUMIN SERPL-MCNC: 3.7 G/DL (ref 3.4–5)
ALP BLD-CCNC: 91 U/L (ref 40–129)
ALT SERPL-CCNC: 13 U/L (ref 10–40)
ANION GAP SERPL CALCULATED.3IONS-SCNC: 14 MMOL/L (ref 3–16)
AST SERPL-CCNC: 19 U/L (ref 15–37)
BASOPHILS ABSOLUTE: 0 K/UL (ref 0–0.2)
BASOPHILS RELATIVE PERCENT: 0.7 %
BILIRUB SERPL-MCNC: 0.5 MG/DL (ref 0–1)
BILIRUBIN DIRECT: <0.2 MG/DL (ref 0–0.3)
BILIRUBIN, INDIRECT: ABNORMAL MG/DL (ref 0–1)
BUN BLDV-MCNC: 23 MG/DL (ref 7–20)
CALCIUM SERPL-MCNC: 8.7 MG/DL (ref 8.3–10.6)
CHLORIDE BLD-SCNC: 92 MMOL/L (ref 99–110)
CHOLESTEROL, TOTAL: 156 MG/DL (ref 0–199)
CO2: 30 MMOL/L (ref 21–32)
CREAT SERPL-MCNC: 1.5 MG/DL (ref 0.8–1.3)
EOSINOPHILS ABSOLUTE: 0.1 K/UL (ref 0–0.6)
EOSINOPHILS RELATIVE PERCENT: 2.7 %
GFR AFRICAN AMERICAN: 55
GFR NON-AFRICAN AMERICAN: 46
GLUCOSE BLD-MCNC: 221 MG/DL (ref 70–99)
HCT VFR BLD CALC: 44.6 % (ref 40.5–52.5)
HDLC SERPL-MCNC: 74 MG/DL (ref 40–60)
HEMOGLOBIN: 14.9 G/DL (ref 13.5–17.5)
LDL CHOLESTEROL CALCULATED: 68 MG/DL
LYMPHOCYTES ABSOLUTE: 1.4 K/UL (ref 1–5.1)
LYMPHOCYTES RELATIVE PERCENT: 30.4 %
MCH RBC QN AUTO: 31.4 PG (ref 26–34)
MCHC RBC AUTO-ENTMCNC: 33.4 G/DL (ref 31–36)
MCV RBC AUTO: 94.2 FL (ref 80–100)
MONOCYTES ABSOLUTE: 0.5 K/UL (ref 0–1.3)
MONOCYTES RELATIVE PERCENT: 10.4 %
NEUTROPHILS ABSOLUTE: 2.5 K/UL (ref 1.7–7.7)
NEUTROPHILS RELATIVE PERCENT: 55.8 %
PDW BLD-RTO: 13.6 % (ref 12.4–15.4)
PLATELET # BLD: 191 K/UL (ref 135–450)
PMV BLD AUTO: 7.9 FL (ref 5–10.5)
POTASSIUM SERPL-SCNC: 4 MMOL/L (ref 3.5–5.1)
RBC # BLD: 4.73 M/UL (ref 4.2–5.9)
SODIUM BLD-SCNC: 136 MMOL/L (ref 136–145)
TOTAL CK: 122 U/L (ref 39–308)
TOTAL PROTEIN: 5.9 G/DL (ref 6.4–8.2)
TRIGL SERPL-MCNC: 69 MG/DL (ref 0–150)
TSH SERPL DL<=0.05 MIU/L-ACNC: 3.81 UIU/ML (ref 0.27–4.2)
VLDLC SERPL CALC-MCNC: 14 MG/DL
WBC # BLD: 4.5 K/UL (ref 4–11)

## 2018-05-22 LAB
ESTIMATED AVERAGE GLUCOSE: 185.8 MG/DL
HBA1C MFR BLD: 8.1 %

## 2018-05-23 LAB — DIABETIC RETINOPATHY: NEGATIVE

## 2018-06-01 ENCOUNTER — OFFICE VISIT (OUTPATIENT)
Dept: INTERNAL MEDICINE CLINIC | Age: 73
End: 2018-06-01

## 2018-06-01 VITALS
WEIGHT: 225 LBS | HEART RATE: 76 BPM | BODY MASS INDEX: 38.62 KG/M2 | DIASTOLIC BLOOD PRESSURE: 70 MMHG | SYSTOLIC BLOOD PRESSURE: 115 MMHG

## 2018-06-01 DIAGNOSIS — E78.2 MIXED HYPERLIPIDEMIA: ICD-10-CM

## 2018-06-01 DIAGNOSIS — M48.061 SPINAL STENOSIS OF LUMBAR REGION WITHOUT NEUROGENIC CLAUDICATION: Primary | ICD-10-CM

## 2018-06-01 DIAGNOSIS — E11.65 TYPE 2 DIABETES MELLITUS WITH HYPERGLYCEMIA, WITH LONG-TERM CURRENT USE OF INSULIN (HCC): ICD-10-CM

## 2018-06-01 DIAGNOSIS — I10 ESSENTIAL HYPERTENSION: ICD-10-CM

## 2018-06-01 DIAGNOSIS — N18.30 CRF (CHRONIC RENAL FAILURE), STAGE 3 (MODERATE) (HCC): ICD-10-CM

## 2018-06-01 DIAGNOSIS — Z79.4 TYPE 2 DIABETES MELLITUS WITH HYPERGLYCEMIA, WITH LONG-TERM CURRENT USE OF INSULIN (HCC): ICD-10-CM

## 2018-06-01 PROCEDURE — 99213 OFFICE O/P EST LOW 20 MIN: CPT | Performed by: INTERNAL MEDICINE

## 2018-06-01 RX ORDER — OXYCODONE HYDROCHLORIDE AND ACETAMINOPHEN 5; 325 MG/1; MG/1
1 TABLET ORAL EVERY 8 HOURS PRN
Qty: 120 TABLET | Refills: 0 | Status: SHIPPED | OUTPATIENT
Start: 2018-06-01 | End: 2018-07-16 | Stop reason: SDUPTHER

## 2018-06-01 ASSESSMENT — PATIENT HEALTH QUESTIONNAIRE - PHQ9
SUM OF ALL RESPONSES TO PHQ9 QUESTIONS 1 & 2: 0
1. LITTLE INTEREST OR PLEASURE IN DOING THINGS: 0
2. FEELING DOWN, DEPRESSED OR HOPELESS: 0
SUM OF ALL RESPONSES TO PHQ QUESTIONS 1-9: 0

## 2018-06-12 RX ORDER — DULOXETIN HYDROCHLORIDE 30 MG/1
CAPSULE, DELAYED RELEASE ORAL
Qty: 90 CAPSULE | Refills: 3 | Status: SHIPPED | OUTPATIENT
Start: 2018-06-12 | End: 2019-06-13 | Stop reason: SDUPTHER

## 2018-07-16 DIAGNOSIS — M48.061 SPINAL STENOSIS OF LUMBAR REGION WITHOUT NEUROGENIC CLAUDICATION: ICD-10-CM

## 2018-07-16 RX ORDER — OXYCODONE HYDROCHLORIDE AND ACETAMINOPHEN 5; 325 MG/1; MG/1
1 TABLET ORAL EVERY 8 HOURS PRN
Qty: 120 TABLET | Refills: 0 | Status: SHIPPED | OUTPATIENT
Start: 2018-07-16 | End: 2018-08-06 | Stop reason: SDUPTHER

## 2018-07-16 NOTE — TELEPHONE ENCOUNTER
Controlled Substances Monitoring:     RX Monitoring 7/16/2018   Attestation The Prescription Monitoring Report for this patient was reviewed today. Documentation No signs of potential drug abuse or diversion identified.

## 2018-08-03 DIAGNOSIS — M48.061 SPINAL STENOSIS OF LUMBAR REGION WITHOUT NEUROGENIC CLAUDICATION: ICD-10-CM

## 2018-08-03 RX ORDER — OXYCODONE HYDROCHLORIDE AND ACETAMINOPHEN 5; 325 MG/1; MG/1
1 TABLET ORAL EVERY 8 HOURS PRN
Qty: 120 TABLET | Refills: 0 | Status: CANCELLED | OUTPATIENT
Start: 2018-08-03 | End: 2018-09-02

## 2018-08-03 NOTE — TELEPHONE ENCOUNTER
Patient did not get last prescription filled in time, now needs a new script.  Please call when ready

## 2018-08-06 ENCOUNTER — OFFICE VISIT (OUTPATIENT)
Dept: INTERNAL MEDICINE CLINIC | Age: 73
End: 2018-08-06

## 2018-08-06 ENCOUNTER — TELEPHONE (OUTPATIENT)
Dept: INTERNAL MEDICINE CLINIC | Age: 73
End: 2018-08-06

## 2018-08-06 VITALS
WEIGHT: 224 LBS | SYSTOLIC BLOOD PRESSURE: 120 MMHG | HEART RATE: 76 BPM | DIASTOLIC BLOOD PRESSURE: 75 MMHG | BODY MASS INDEX: 38.45 KG/M2

## 2018-08-06 DIAGNOSIS — M48.061 SPINAL STENOSIS OF LUMBAR REGION WITHOUT NEUROGENIC CLAUDICATION: ICD-10-CM

## 2018-08-06 DIAGNOSIS — E03.4 HYPOTHYROIDISM DUE TO ACQUIRED ATROPHY OF THYROID: ICD-10-CM

## 2018-08-06 DIAGNOSIS — Z79.4 TYPE 2 DIABETES MELLITUS WITH STAGE 3 CHRONIC KIDNEY DISEASE, WITH LONG-TERM CURRENT USE OF INSULIN (HCC): Primary | ICD-10-CM

## 2018-08-06 DIAGNOSIS — N18.30 CHRONIC RENAL FAILURE, STAGE 3 (MODERATE) (HCC): ICD-10-CM

## 2018-08-06 DIAGNOSIS — E11.22 TYPE 2 DIABETES MELLITUS WITH STAGE 3 CHRONIC KIDNEY DISEASE, WITH LONG-TERM CURRENT USE OF INSULIN (HCC): Primary | ICD-10-CM

## 2018-08-06 DIAGNOSIS — Z23 NEED FOR PROPHYLACTIC VACCINATION AND INOCULATION AGAINST VARICELLA: ICD-10-CM

## 2018-08-06 DIAGNOSIS — E78.2 MIXED HYPERLIPIDEMIA: ICD-10-CM

## 2018-08-06 DIAGNOSIS — N18.30 TYPE 2 DIABETES MELLITUS WITH STAGE 3 CHRONIC KIDNEY DISEASE, WITH LONG-TERM CURRENT USE OF INSULIN (HCC): Primary | ICD-10-CM

## 2018-08-06 DIAGNOSIS — Z23 NEED FOR PROPHYLACTIC VACCINATION AGAINST DIPHTHERIA-TETANUS-PERTUSSIS (DTP): ICD-10-CM

## 2018-08-06 DIAGNOSIS — I10 ESSENTIAL HYPERTENSION: ICD-10-CM

## 2018-08-06 LAB
ANION GAP SERPL CALCULATED.3IONS-SCNC: 15 MMOL/L (ref 3–16)
BUN BLDV-MCNC: 25 MG/DL (ref 7–20)
CALCIUM SERPL-MCNC: 9.2 MG/DL (ref 8.3–10.6)
CHLORIDE BLD-SCNC: 88 MMOL/L (ref 99–110)
CO2: 30 MMOL/L (ref 21–32)
CREAT SERPL-MCNC: 1.6 MG/DL (ref 0.8–1.3)
GFR AFRICAN AMERICAN: 51
GFR NON-AFRICAN AMERICAN: 42
GLUCOSE BLD-MCNC: 324 MG/DL (ref 70–99)
POTASSIUM SERPL-SCNC: 3.6 MMOL/L (ref 3.5–5.1)
SODIUM BLD-SCNC: 133 MMOL/L (ref 136–145)

## 2018-08-06 PROCEDURE — 99213 OFFICE O/P EST LOW 20 MIN: CPT | Performed by: INTERNAL MEDICINE

## 2018-08-06 RX ORDER — OXYCODONE HYDROCHLORIDE AND ACETAMINOPHEN 5; 325 MG/1; MG/1
1 TABLET ORAL EVERY 8 HOURS PRN
Qty: 120 TABLET | Refills: 0 | Status: SHIPPED | OUTPATIENT
Start: 2018-08-06 | End: 2018-09-04 | Stop reason: SDUPTHER

## 2018-08-21 RX ORDER — FUROSEMIDE 40 MG/1
TABLET ORAL
Qty: 90 TABLET | Refills: 3 | Status: SHIPPED | OUTPATIENT
Start: 2018-08-21 | End: 2019-09-10 | Stop reason: SDUPTHER

## 2018-08-21 RX ORDER — ATORVASTATIN CALCIUM 20 MG/1
TABLET, FILM COATED ORAL
Qty: 90 TABLET | Refills: 3 | Status: SHIPPED | OUTPATIENT
Start: 2018-08-21 | End: 2019-09-10 | Stop reason: SDUPTHER

## 2018-09-04 DIAGNOSIS — M48.061 SPINAL STENOSIS OF LUMBAR REGION WITHOUT NEUROGENIC CLAUDICATION: ICD-10-CM

## 2018-09-04 DIAGNOSIS — F41.1 GENERALIZED ANXIETY DISORDER: Primary | ICD-10-CM

## 2018-09-04 RX ORDER — OXYCODONE HYDROCHLORIDE AND ACETAMINOPHEN 5; 325 MG/1; MG/1
1 TABLET ORAL EVERY 8 HOURS PRN
Qty: 120 TABLET | Refills: 0 | Status: SHIPPED | OUTPATIENT
Start: 2018-09-04 | End: 2018-10-05 | Stop reason: SDUPTHER

## 2018-09-04 RX ORDER — LORAZEPAM 0.5 MG/1
0.5 TABLET ORAL NIGHTLY PRN
Qty: 90 TABLET | Refills: 0 | Status: SHIPPED | OUTPATIENT
Start: 2018-09-04 | End: 2019-06-03 | Stop reason: SDUPTHER

## 2018-09-04 NOTE — TELEPHONE ENCOUNTER
Nancy Horton called and stated Hebert needs refills. She would like to  Wednesday morning. Please advise.

## 2018-09-04 NOTE — TELEPHONE ENCOUNTER
Controlled Substances Monitoring:     RX Monitoring 9/4/2018   Attestation The Prescription Monitoring Report for this patient was reviewed today. Documentation No signs of potential drug abuse or diversion identified.

## 2018-09-11 ENCOUNTER — OFFICE VISIT (OUTPATIENT)
Dept: CARDIOLOGY CLINIC | Age: 73
End: 2018-09-11

## 2018-09-11 VITALS
DIASTOLIC BLOOD PRESSURE: 78 MMHG | HEART RATE: 76 BPM | WEIGHT: 227 LBS | SYSTOLIC BLOOD PRESSURE: 126 MMHG | BODY MASS INDEX: 38.76 KG/M2 | HEIGHT: 64 IN

## 2018-09-11 DIAGNOSIS — I10 ESSENTIAL HYPERTENSION: ICD-10-CM

## 2018-09-11 DIAGNOSIS — Z98.61 HISTORY OF PTCA: Primary | ICD-10-CM

## 2018-09-11 DIAGNOSIS — E78.5 HYPERLIPIDEMIA, UNSPECIFIED HYPERLIPIDEMIA TYPE: ICD-10-CM

## 2018-09-11 PROCEDURE — 99213 OFFICE O/P EST LOW 20 MIN: CPT | Performed by: INTERNAL MEDICINE

## 2018-09-11 RX ORDER — NITROGLYCERIN 0.4 MG/1
0.4 TABLET SUBLINGUAL EVERY 5 MIN PRN
Qty: 50 TABLET | Refills: 1 | Status: SHIPPED | OUTPATIENT
Start: 2018-09-11 | End: 2020-09-15 | Stop reason: SDUPTHER

## 2018-09-11 NOTE — PROGRESS NOTES
CARDIOLOGY NOTE      9/11/2018    RE: Shana Storm  ([id number])                               TO:  Dr. Enrico Vega MD    CC:    HP:  Thank you for involving me in taking care of your  patient Shana Storm, who is a  75y.o. year old      male with past medical  history of  Cad, htn, hyperlipidimea, dm  is  seen today Patient  during this  visit has no cardiac comaplins. Vitals:    09/11/18 1109   BP: 126/78   Pulse: 76       Current Outpatient Prescriptions   Medication Sig Dispense Refill    oxyCODONE-acetaminophen (PERCOCET) 5-325 MG per tablet Take 1 tablet by mouth every 8 hours as needed for Pain for up to 30 days. May go to 1 every 6 hrs as needed for extreme pain. Beth Marus Date: 9/4/18 120 tablet 0    LORazepam (ATIVAN) 0.5 MG tablet Take 1 tablet by mouth nightly as needed for Anxiety for up to 90 days. . 90 tablet 0    atorvastatin (LIPITOR) 20 MG tablet TAKE 1 TABLET DAILY 90 tablet 3    furosemide (LASIX) 40 MG tablet TAKE 1 TABLET DAILY 90 tablet 3    sertraline (ZOLOFT) 50 MG tablet TAKE 2 TABLETS DAILY 180 tablet 3    Insulin Syringe-Needle U-100 (B-D INS SYR ULTRAFINE .3CC/30G) 30G X 1/2\" 0.3 ML MISC INJECT AND USE TWICE A  each 3    DULoxetine (CYMBALTA) 30 MG extended release capsule TAKE 1 CAPSULE DAILY 90 capsule 3    tiZANidine (ZANAFLEX) 2 MG capsule TAKE 1 CAPSULE TWICE A DAY AS NEEDED FOR MUSCLE SPASMS 180 capsule 3    HUMULIN N 100 UNIT/ML injection vial INJECT 35 UNITS IN THE MORNING AND 10 UNITS AT SUPPER 50 mL 3    gabapentin (NEURONTIN) 300 MG capsule TAKE 1 CAPSULE IN THE AFTERNOON AND 2 CAPSULES AT BEDTIME 270 capsule 3    nitroGLYCERIN (NITROSTAT) 0.4 MG SL tablet Place 1 tablet under the tongue every 5 minutes as needed for Chest pain 50 tablet 1    potassium chloride (KLOR-CON M) 20 MEQ extended release tablet TAKE 1 TABLET TWICE A  tablet 3    traZODone (DESYREL) 100 MG tablet TAKE 1 TABLET NIGHTLY 90 tablet 3    HUMALOG 100 UNIT/ML injection vial INJECT 6 TO 8 UNITS (VARIES) WITH MEALS TWICE A DAY 30 mL 3    carbidopa-levodopa (SINEMET)  MG per tablet TAKE ONE AND ONE-HALF TABLETS NIGHTLY 135 tablet 3    B-D INS SYR ULTRAFINE .5CC/30G 30G X 1/2\" 0.5 ML MISC USE TWICE A DAY OR AS NEEDED AS DIRECTED 180 each 3    omeprazole (PRILOSEC) 40 MG delayed release capsule TAKE 1 CAPSULE DAILY 90 capsule 3    CARTIA  MG extended release capsule TAKE 1 CAPSULE DAILY 90 capsule 3    allopurinol (ZYLOPRIM) 100 MG tablet TAKE 1 TABLET DAILY FOR ELEVATED URIC ACID 90 tablet 3    hydrochlorothiazide (HYDRODIURIL) 25 MG tablet TAKE 1 TABLET DAILY 90 tablet 3    metolazone (ZAROXOLYN) 2.5 MG tablet Take one tablet once a week. Take 30 minutes before the Lasix. 4 tablet 1    timolol (TIMOPTIC) 0.5 % ophthalmic solution Apply 1 drop to eye daily      Probiotic Product (ALIGN PO) Take 1 tablet by mouth daily      Simethicone (GAS-X PO) Take 2 tablets by mouth Three to four times daily as needed      dicyclomine (BENTYL) 10 MG capsule Take 1 capsule by mouth 4 times daily as needed (abdominal pain) 60 capsule 0    insulin lispro (HUMALOG) 100 UNIT/ML injection vial Inject 5 units in the am and inject 5 units in the pm subcutaneously 3 vial 3    Glucose Blood (BLOOD GLUCOSE TEST STRIPS) STRP Joseluis Contour glucose test strips-uses 4x per day or prn with sliding scale coverage   Dx:250.01 Type 1 Diabetes 400 strip 3    mupirocin (BACTROBAN) 2 % ointment Apply topically 3 times daily. 1 Tube 2    ammonium lactate (LAC-HYDRIN) 12 % lotion Apply to nails, trunk, arms, legs  g 6    ciclopirox (LOPROX) 0.77 % cream Apply to legs, buttocks BID PRN flares 90 g 2    JOSELUIS MICROLET LANCETS MISC Tests 4 to 5 times daily or prn 300 each 3    Polyethylene Glycol 3350 (MIRALAX PO) Take 1 capsule by mouth as needed       B Complex-Biotin-FA (VITAMIN B50 COMPLEX PO) Take 1 tablet by mouth daily.       Vitamin D (CHOLECALCIFEROL) 1000 Pulmonary  No respiratory distress. No wheezes or rales. Pulses: Bilateral radial and pedal pulses normal  Abdomen  no tenderness  Musculoskeletal  normal strength  Neurologic    There are  no gross focal neurologic abnormalities. Skin-  No rash  Affect; normal mood    DATA:  Lab Results   Component Value Date    CKTOTAL 122 05/21/2018     BNP:  No results found for: BNP  PT/INR:  No results found for: PTINR  Lab Results   Component Value Date    LABA1C 8.1 05/21/2018    LABA1C 7.9 03/16/2018     Lab Results   Component Value Date    CHOL 156 05/21/2018    TRIG 69 05/21/2018    HDL 74 (H) 05/21/2018    LDLCALC 68 05/21/2018     Lab Results   Component Value Date    ALT 13 05/21/2018    AST 19 05/21/2018     TSH:    Lab Results   Component Value Date    TSH 3.81 05/21/2018       QUALITY MEASURES:    CHOL LOWERING:  Yes- if No Why  ANTIPLATELET:  Yes - if No why  BETA BLOCKER    yes,   IF  NO WHY  SMOKING HISTORY No COUNSELLED  No  ATRIAL FIBRILLATION  No   ANTICOAG: No        Assessment/ Plan:     Patient seen , interviewed and examined     PLAN:                   -     CORONARY ARTERY DISEASE:  asymptomatic     All available  tests in chart reviewed. Management discussed . Testing ordered  no                               -  Hypertension: Patients blood pressure is normal. Patient is advised about low sodium diet. Present medical regimen will not be changed. -  LIPID MANAGEMENT:  Available lipid  lab data reviewed  and patient was given dietary advice. NCEP- ATP III guidelines reviewed with patient. -   Changes  in medicines made: No           -   DIABETES MELLITUS: Available pertinent lab data reviewed   and  patient was given dietary advice . Advised to check blood glucose level on a regular basis.       -   Changes  in medicines made: No  251 this am           - Has back issues

## 2018-09-13 RX ORDER — DILTIAZEM HYDROCHLORIDE 180 MG/1
CAPSULE, EXTENDED RELEASE ORAL
Qty: 90 CAPSULE | Refills: 3 | Status: SHIPPED | OUTPATIENT
Start: 2018-09-13 | End: 2019-10-03 | Stop reason: SDUPTHER

## 2018-09-13 RX ORDER — HYDROCHLOROTHIAZIDE 25 MG/1
TABLET ORAL
Qty: 90 TABLET | Refills: 3 | Status: SHIPPED | OUTPATIENT
Start: 2018-09-13 | End: 2019-09-10 | Stop reason: SDUPTHER

## 2018-10-05 ENCOUNTER — OFFICE VISIT (OUTPATIENT)
Dept: INTERNAL MEDICINE CLINIC | Age: 73
End: 2018-10-05
Payer: MEDICARE

## 2018-10-05 VITALS
HEART RATE: 68 BPM | WEIGHT: 227.2 LBS | DIASTOLIC BLOOD PRESSURE: 62 MMHG | SYSTOLIC BLOOD PRESSURE: 110 MMHG | BODY MASS INDEX: 39 KG/M2

## 2018-10-05 DIAGNOSIS — E03.4 HYPOTHYROIDISM DUE TO ACQUIRED ATROPHY OF THYROID: ICD-10-CM

## 2018-10-05 DIAGNOSIS — Z23 NEEDS FLU SHOT: ICD-10-CM

## 2018-10-05 DIAGNOSIS — Z79.4 TYPE 2 DIABETES MELLITUS WITH STAGE 3 CHRONIC KIDNEY DISEASE, WITH LONG-TERM CURRENT USE OF INSULIN (HCC): Primary | ICD-10-CM

## 2018-10-05 DIAGNOSIS — I10 ESSENTIAL HYPERTENSION: ICD-10-CM

## 2018-10-05 DIAGNOSIS — N18.30 TYPE 2 DIABETES MELLITUS WITH STAGE 3 CHRONIC KIDNEY DISEASE, WITH LONG-TERM CURRENT USE OF INSULIN (HCC): Primary | ICD-10-CM

## 2018-10-05 DIAGNOSIS — N18.30 CHRONIC RENAL FAILURE, STAGE 3 (MODERATE) (HCC): ICD-10-CM

## 2018-10-05 DIAGNOSIS — E11.22 TYPE 2 DIABETES MELLITUS WITH STAGE 3 CHRONIC KIDNEY DISEASE, WITH LONG-TERM CURRENT USE OF INSULIN (HCC): Primary | ICD-10-CM

## 2018-10-05 DIAGNOSIS — M48.061 SPINAL STENOSIS OF LUMBAR REGION WITHOUT NEUROGENIC CLAUDICATION: ICD-10-CM

## 2018-10-05 DIAGNOSIS — E78.2 MIXED HYPERLIPIDEMIA: ICD-10-CM

## 2018-10-05 PROCEDURE — 99214 OFFICE O/P EST MOD 30 MIN: CPT | Performed by: INTERNAL MEDICINE

## 2018-10-05 PROCEDURE — G0008 ADMIN INFLUENZA VIRUS VAC: HCPCS | Performed by: INTERNAL MEDICINE

## 2018-10-05 PROCEDURE — 90662 IIV NO PRSV INCREASED AG IM: CPT | Performed by: INTERNAL MEDICINE

## 2018-10-05 RX ORDER — OXYCODONE HYDROCHLORIDE AND ACETAMINOPHEN 5; 325 MG/1; MG/1
1 TABLET ORAL EVERY 8 HOURS PRN
Qty: 120 TABLET | Refills: 0 | Status: SHIPPED | OUTPATIENT
Start: 2018-10-05 | End: 2018-11-12 | Stop reason: SDUPTHER

## 2018-10-09 DIAGNOSIS — I10 ESSENTIAL HYPERTENSION: ICD-10-CM

## 2018-10-09 DIAGNOSIS — Z79.4 TYPE 2 DIABETES MELLITUS WITH STAGE 3 CHRONIC KIDNEY DISEASE, WITH LONG-TERM CURRENT USE OF INSULIN (HCC): ICD-10-CM

## 2018-10-09 DIAGNOSIS — E11.22 TYPE 2 DIABETES MELLITUS WITH STAGE 3 CHRONIC KIDNEY DISEASE, WITH LONG-TERM CURRENT USE OF INSULIN (HCC): ICD-10-CM

## 2018-10-09 DIAGNOSIS — E03.4 HYPOTHYROIDISM DUE TO ACQUIRED ATROPHY OF THYROID: ICD-10-CM

## 2018-10-09 DIAGNOSIS — N18.30 TYPE 2 DIABETES MELLITUS WITH STAGE 3 CHRONIC KIDNEY DISEASE, WITH LONG-TERM CURRENT USE OF INSULIN (HCC): ICD-10-CM

## 2018-10-09 DIAGNOSIS — E78.2 MIXED HYPERLIPIDEMIA: ICD-10-CM

## 2018-10-12 RX ORDER — OMEPRAZOLE 40 MG/1
CAPSULE, DELAYED RELEASE ORAL
Qty: 90 CAPSULE | Refills: 3 | Status: SHIPPED | OUTPATIENT
Start: 2018-10-12 | End: 2019-10-03 | Stop reason: SDUPTHER

## 2018-10-29 RX ORDER — TRAZODONE HYDROCHLORIDE 100 MG/1
TABLET ORAL
Qty: 90 TABLET | Refills: 3 | Status: SHIPPED | OUTPATIENT
Start: 2018-10-29 | End: 2019-10-03 | Stop reason: SDUPTHER

## 2018-10-29 RX ORDER — POTASSIUM CHLORIDE 20 MEQ/1
TABLET, EXTENDED RELEASE ORAL
Qty: 180 TABLET | Refills: 3 | Status: SHIPPED | OUTPATIENT
Start: 2018-10-29 | End: 2019-10-28 | Stop reason: SDUPTHER

## 2018-10-29 RX ORDER — ALLOPURINOL 100 MG/1
TABLET ORAL
Qty: 90 TABLET | Refills: 3 | Status: SHIPPED | OUTPATIENT
Start: 2018-10-29 | End: 2019-02-05 | Stop reason: SDUPTHER

## 2018-11-12 ENCOUNTER — TELEPHONE (OUTPATIENT)
Dept: INTERNAL MEDICINE CLINIC | Age: 73
End: 2018-11-12

## 2018-11-12 DIAGNOSIS — M48.061 SPINAL STENOSIS OF LUMBAR REGION WITHOUT NEUROGENIC CLAUDICATION: ICD-10-CM

## 2018-11-13 RX ORDER — OXYCODONE HYDROCHLORIDE AND ACETAMINOPHEN 5; 325 MG/1; MG/1
1 TABLET ORAL EVERY 8 HOURS PRN
Qty: 120 TABLET | Refills: 0 | Status: SHIPPED | OUTPATIENT
Start: 2018-11-13 | End: 2019-01-03 | Stop reason: SDUPTHER

## 2018-11-21 DIAGNOSIS — Z79.4 TYPE 2 DIABETES MELLITUS WITH STAGE 3 CHRONIC KIDNEY DISEASE, WITH LONG-TERM CURRENT USE OF INSULIN (HCC): ICD-10-CM

## 2018-11-21 DIAGNOSIS — N18.30 TYPE 2 DIABETES MELLITUS WITH STAGE 3 CHRONIC KIDNEY DISEASE, WITH LONG-TERM CURRENT USE OF INSULIN (HCC): ICD-10-CM

## 2018-11-21 DIAGNOSIS — I87.2 VENOUS INSUFFICIENCY OF BOTH LOWER EXTREMITIES: ICD-10-CM

## 2018-11-21 DIAGNOSIS — E11.22 TYPE 2 DIABETES MELLITUS WITH STAGE 3 CHRONIC KIDNEY DISEASE, WITH LONG-TERM CURRENT USE OF INSULIN (HCC): ICD-10-CM

## 2018-11-21 DIAGNOSIS — I10 ESSENTIAL HYPERTENSION: Primary | ICD-10-CM

## 2018-11-27 ENCOUNTER — CARE COORDINATION (OUTPATIENT)
Dept: CARE COORDINATION | Age: 73
End: 2018-11-27

## 2018-11-28 NOTE — CARE COORDINATION
ACC noted pt on \"Need to Enroll List.\" Mailed Introduction Letter in hopes to enroll pt in the Care Coordination Program, [facility] will f/u per CC Program Protocol. Janine Hinds RN  Ambulatory Care Coordinator  [phone]  [email or web address]

## 2018-12-06 ENCOUNTER — OFFICE VISIT (OUTPATIENT)
Dept: INTERNAL MEDICINE CLINIC | Age: 73
End: 2018-12-06
Payer: MEDICARE

## 2018-12-06 VITALS
DIASTOLIC BLOOD PRESSURE: 74 MMHG | SYSTOLIC BLOOD PRESSURE: 124 MMHG | WEIGHT: 224 LBS | RESPIRATION RATE: 15 BRPM | BODY MASS INDEX: 38.45 KG/M2 | HEART RATE: 65 BPM

## 2018-12-06 DIAGNOSIS — I10 ESSENTIAL HYPERTENSION: ICD-10-CM

## 2018-12-06 DIAGNOSIS — E11.22 TYPE 2 DIABETES MELLITUS WITH STAGE 3 CHRONIC KIDNEY DISEASE, WITH LONG-TERM CURRENT USE OF INSULIN (HCC): Primary | ICD-10-CM

## 2018-12-06 DIAGNOSIS — N18.30 TYPE 2 DIABETES MELLITUS WITH STAGE 3 CHRONIC KIDNEY DISEASE, WITH LONG-TERM CURRENT USE OF INSULIN (HCC): Primary | ICD-10-CM

## 2018-12-06 DIAGNOSIS — N18.30 CHRONIC RENAL FAILURE, STAGE 3 (MODERATE) (HCC): ICD-10-CM

## 2018-12-06 DIAGNOSIS — Z79.4 TYPE 2 DIABETES MELLITUS WITH STAGE 3 CHRONIC KIDNEY DISEASE, WITH LONG-TERM CURRENT USE OF INSULIN (HCC): Primary | ICD-10-CM

## 2018-12-06 DIAGNOSIS — E87.6 HYPOKALEMIA: ICD-10-CM

## 2018-12-06 LAB
ANION GAP SERPL CALCULATED.3IONS-SCNC: 18 MMOL/L (ref 3–16)
BUN BLDV-MCNC: 25 MG/DL (ref 7–20)
CALCIUM SERPL-MCNC: 9.1 MG/DL (ref 8.3–10.6)
CHLORIDE BLD-SCNC: 93 MMOL/L (ref 99–110)
CO2: 27 MMOL/L (ref 21–32)
CREAT SERPL-MCNC: 1.6 MG/DL (ref 0.8–1.3)
GFR AFRICAN AMERICAN: 51
GFR NON-AFRICAN AMERICAN: 42
GLUCOSE BLD-MCNC: 183 MG/DL (ref 70–99)
POTASSIUM SERPL-SCNC: 3.5 MMOL/L (ref 3.5–5.1)
SODIUM BLD-SCNC: 138 MMOL/L (ref 136–145)

## 2018-12-06 PROCEDURE — 99213 OFFICE O/P EST LOW 20 MIN: CPT | Performed by: INTERNAL MEDICINE

## 2018-12-06 PROCEDURE — 36415 COLL VENOUS BLD VENIPUNCTURE: CPT | Performed by: INTERNAL MEDICINE

## 2018-12-07 LAB
ESTIMATED AVERAGE GLUCOSE: 188.6 MG/DL
HBA1C MFR BLD: 8.2 %

## 2019-01-03 DIAGNOSIS — M48.061 SPINAL STENOSIS OF LUMBAR REGION WITHOUT NEUROGENIC CLAUDICATION: ICD-10-CM

## 2019-01-03 RX ORDER — OXYCODONE HYDROCHLORIDE AND ACETAMINOPHEN 5; 325 MG/1; MG/1
1 TABLET ORAL EVERY 8 HOURS PRN
Qty: 120 TABLET | Refills: 0 | Status: SHIPPED | OUTPATIENT
Start: 2019-01-03 | End: 2019-02-05 | Stop reason: SDUPTHER

## 2019-01-10 DIAGNOSIS — F41.9 ANXIETY: Primary | ICD-10-CM

## 2019-01-10 RX ORDER — LORAZEPAM 0.5 MG/1
TABLET ORAL
Qty: 90 TABLET | Refills: 0 | Status: SHIPPED | OUTPATIENT
Start: 2019-01-10 | End: 2019-04-10

## 2019-01-18 ENCOUNTER — OFFICE VISIT (OUTPATIENT)
Dept: INTERNAL MEDICINE CLINIC | Age: 74
End: 2019-01-18
Payer: MEDICARE

## 2019-01-18 VITALS
DIASTOLIC BLOOD PRESSURE: 70 MMHG | HEIGHT: 61 IN | BODY MASS INDEX: 41.72 KG/M2 | HEART RATE: 76 BPM | WEIGHT: 221 LBS | SYSTOLIC BLOOD PRESSURE: 125 MMHG

## 2019-01-18 DIAGNOSIS — E03.4 HYPOTHYROIDISM DUE TO ACQUIRED ATROPHY OF THYROID: ICD-10-CM

## 2019-01-18 DIAGNOSIS — N18.30 TYPE 2 DIABETES MELLITUS WITH STAGE 3 CHRONIC KIDNEY DISEASE, WITH LONG-TERM CURRENT USE OF INSULIN (HCC): Primary | ICD-10-CM

## 2019-01-18 DIAGNOSIS — E53.8 VITAMIN B12 DEFICIENCY: ICD-10-CM

## 2019-01-18 DIAGNOSIS — I25.10 ASHD (ARTERIOSCLEROTIC HEART DISEASE): ICD-10-CM

## 2019-01-18 DIAGNOSIS — E78.2 MIXED HYPERLIPIDEMIA: ICD-10-CM

## 2019-01-18 DIAGNOSIS — E11.42 DIABETIC PERIPHERAL NEUROPATHY (HCC): ICD-10-CM

## 2019-01-18 DIAGNOSIS — E79.0 HYPERURICEMIA: ICD-10-CM

## 2019-01-18 DIAGNOSIS — R00.2 PALPITATIONS: ICD-10-CM

## 2019-01-18 DIAGNOSIS — I10 ESSENTIAL HYPERTENSION: ICD-10-CM

## 2019-01-18 DIAGNOSIS — Z79.4 TYPE 2 DIABETES MELLITUS WITH STAGE 3 CHRONIC KIDNEY DISEASE, WITH LONG-TERM CURRENT USE OF INSULIN (HCC): Primary | ICD-10-CM

## 2019-01-18 DIAGNOSIS — N40.0 PROSTATE HYPERTROPHY: ICD-10-CM

## 2019-01-18 DIAGNOSIS — E11.22 TYPE 2 DIABETES MELLITUS WITH STAGE 3 CHRONIC KIDNEY DISEASE, WITH LONG-TERM CURRENT USE OF INSULIN (HCC): Primary | ICD-10-CM

## 2019-01-18 DIAGNOSIS — N18.30 CHRONIC RENAL FAILURE, STAGE 3 (MODERATE) (HCC): ICD-10-CM

## 2019-01-18 DIAGNOSIS — Z23 NEED FOR PROPHYLACTIC VACCINATION AGAINST DIPHTHERIA-TETANUS-PERTUSSIS (DTP): ICD-10-CM

## 2019-01-18 DIAGNOSIS — E55.9 VITAMIN D DEFICIENCY: ICD-10-CM

## 2019-01-18 PROCEDURE — 99215 OFFICE O/P EST HI 40 MIN: CPT | Performed by: INTERNAL MEDICINE

## 2019-01-18 PROCEDURE — 36415 COLL VENOUS BLD VENIPUNCTURE: CPT | Performed by: INTERNAL MEDICINE

## 2019-01-18 PROCEDURE — 93000 ELECTROCARDIOGRAM COMPLETE: CPT | Performed by: INTERNAL MEDICINE

## 2019-01-18 PROCEDURE — 81001 URINALYSIS AUTO W/SCOPE: CPT | Performed by: INTERNAL MEDICINE

## 2019-01-18 RX ORDER — GABAPENTIN 300 MG/1
CAPSULE ORAL
Qty: 270 CAPSULE | Refills: 3 | Status: SHIPPED | OUTPATIENT
Start: 2019-01-18 | End: 2020-02-26 | Stop reason: SDUPTHER

## 2019-01-18 RX ORDER — METOLAZONE 2.5 MG/1
TABLET ORAL
Qty: 4 TABLET | Refills: 1 | Status: SHIPPED | OUTPATIENT
Start: 2019-01-18 | End: 2019-08-19 | Stop reason: SDUPTHER

## 2019-01-19 LAB
A/G RATIO: 1.7 (ref 1.1–2.2)
ALBUMIN SERPL-MCNC: 4.1 G/DL (ref 3.4–5)
ALP BLD-CCNC: 104 U/L (ref 40–129)
ALT SERPL-CCNC: 13 U/L (ref 10–40)
ANION GAP SERPL CALCULATED.3IONS-SCNC: 16 MMOL/L (ref 3–16)
AST SERPL-CCNC: 18 U/L (ref 15–37)
BACTERIA: ABNORMAL /HPF
BASOPHILS ABSOLUTE: 0 K/UL (ref 0–0.2)
BASOPHILS RELATIVE PERCENT: 0.7 %
BILIRUB SERPL-MCNC: 0.4 MG/DL (ref 0–1)
BILIRUBIN URINE: NEGATIVE
BLOOD, URINE: NEGATIVE
BUN BLDV-MCNC: 22 MG/DL (ref 7–20)
CALCIUM SERPL-MCNC: 9 MG/DL (ref 8.3–10.6)
CHLORIDE BLD-SCNC: 94 MMOL/L (ref 99–110)
CHOLESTEROL, TOTAL: 164 MG/DL (ref 0–199)
CLARITY: CLEAR
CO2: 29 MMOL/L (ref 21–32)
COLOR: YELLOW
COMMENT UA: ABNORMAL
CREAT SERPL-MCNC: 1.8 MG/DL (ref 0.8–1.3)
CREATININE URINE: 92 MG/DL (ref 39–259)
EOSINOPHILS ABSOLUTE: 0.2 K/UL (ref 0–0.6)
EOSINOPHILS RELATIVE PERCENT: 3.8 %
EPITHELIAL CELLS, UA: 0 /HPF (ref 0–5)
ESTIMATED AVERAGE GLUCOSE: 191.5 MG/DL
FOLATE: >20 NG/ML (ref 4.78–24.2)
GFR AFRICAN AMERICAN: 45
GFR NON-AFRICAN AMERICAN: 37
GLOBULIN: 2.4 G/DL
GLUCOSE BLD-MCNC: 145 MG/DL (ref 70–99)
GLUCOSE URINE: NEGATIVE MG/DL
HBA1C MFR BLD: 8.3 %
HCT VFR BLD CALC: 44.8 % (ref 40.5–52.5)
HDLC SERPL-MCNC: 65 MG/DL (ref 40–60)
HEMOGLOBIN: 15.2 G/DL (ref 13.5–17.5)
HYALINE CASTS: 0 /LPF (ref 0–8)
KETONES, URINE: NEGATIVE MG/DL
LDL CHOLESTEROL CALCULATED: 73 MG/DL
LEUKOCYTE ESTERASE, URINE: NEGATIVE
LYMPHOCYTES ABSOLUTE: 1.5 K/UL (ref 1–5.1)
LYMPHOCYTES RELATIVE PERCENT: 26 %
MCH RBC QN AUTO: 32.1 PG (ref 26–34)
MCHC RBC AUTO-ENTMCNC: 34 G/DL (ref 31–36)
MCV RBC AUTO: 94.4 FL (ref 80–100)
MICROALBUMIN UR-MCNC: 1.3 MG/DL
MICROALBUMIN/CREAT UR-RTO: 14.1 MG/G (ref 0–30)
MICROSCOPIC EXAMINATION: ABNORMAL
MONOCYTES ABSOLUTE: 0.6 K/UL (ref 0–1.3)
MONOCYTES RELATIVE PERCENT: 10.1 %
NEUTROPHILS ABSOLUTE: 3.4 K/UL (ref 1.7–7.7)
NEUTROPHILS RELATIVE PERCENT: 59.4 %
NITRITE, URINE: NEGATIVE
PDW BLD-RTO: 13.6 % (ref 12.4–15.4)
PH UA: 7.5
PLATELET # BLD: 197 K/UL (ref 135–450)
PMV BLD AUTO: 8.3 FL (ref 5–10.5)
POTASSIUM SERPL-SCNC: 3.5 MMOL/L (ref 3.5–5.1)
PROSTATE SPECIFIC ANTIGEN: 2.04 NG/ML (ref 0–4)
PROTEIN UA: NEGATIVE MG/DL
RBC # BLD: 4.74 M/UL (ref 4.2–5.9)
RBC UA: ABNORMAL /HPF (ref 0–2)
SODIUM BLD-SCNC: 139 MMOL/L (ref 136–145)
SPECIFIC GRAVITY UA: 1.01
TOTAL CK: 81 U/L (ref 39–308)
TOTAL PROTEIN: 6.5 G/DL (ref 6.4–8.2)
TRIGL SERPL-MCNC: 128 MG/DL (ref 0–150)
TSH SERPL DL<=0.05 MIU/L-ACNC: 4.12 UIU/ML (ref 0.27–4.2)
URIC ACID, SERUM: 7.6 MG/DL (ref 3.5–7.2)
UROBILINOGEN, URINE: 0.2 E.U./DL
VITAMIN B-12: 975 PG/ML (ref 211–911)
VITAMIN D 25-HYDROXY: 48.9 NG/ML
VLDLC SERPL CALC-MCNC: 26 MG/DL
WBC # BLD: 5.7 K/UL (ref 4–11)
WBC UA: 2 /HPF (ref 0–5)

## 2019-02-05 ENCOUNTER — OFFICE VISIT (OUTPATIENT)
Dept: INTERNAL MEDICINE CLINIC | Age: 74
End: 2019-02-05
Payer: MEDICARE

## 2019-02-05 ENCOUNTER — CARE COORDINATION (OUTPATIENT)
Dept: CARE COORDINATION | Age: 74
End: 2019-02-05

## 2019-02-05 VITALS
DIASTOLIC BLOOD PRESSURE: 70 MMHG | WEIGHT: 221.2 LBS | HEART RATE: 76 BPM | SYSTOLIC BLOOD PRESSURE: 110 MMHG | BODY MASS INDEX: 41.8 KG/M2

## 2019-02-05 DIAGNOSIS — N18.30 TYPE 2 DIABETES MELLITUS WITH STAGE 3 CHRONIC KIDNEY DISEASE, WITH LONG-TERM CURRENT USE OF INSULIN (HCC): ICD-10-CM

## 2019-02-05 DIAGNOSIS — Z79.4 TYPE 2 DIABETES MELLITUS WITH STAGE 3 CHRONIC KIDNEY DISEASE, WITH LONG-TERM CURRENT USE OF INSULIN (HCC): ICD-10-CM

## 2019-02-05 DIAGNOSIS — E11.22 TYPE 2 DIABETES MELLITUS WITH STAGE 3 CHRONIC KIDNEY DISEASE, WITH LONG-TERM CURRENT USE OF INSULIN (HCC): Primary | ICD-10-CM

## 2019-02-05 DIAGNOSIS — I87.2 VENOUS INSUFFICIENCY OF BOTH LOWER EXTREMITIES: ICD-10-CM

## 2019-02-05 DIAGNOSIS — N18.30 TYPE 2 DIABETES MELLITUS WITH STAGE 3 CHRONIC KIDNEY DISEASE, WITH LONG-TERM CURRENT USE OF INSULIN (HCC): Primary | ICD-10-CM

## 2019-02-05 DIAGNOSIS — M48.061 SPINAL STENOSIS OF LUMBAR REGION WITHOUT NEUROGENIC CLAUDICATION: Primary | ICD-10-CM

## 2019-02-05 DIAGNOSIS — E78.2 MIXED HYPERLIPIDEMIA: ICD-10-CM

## 2019-02-05 DIAGNOSIS — E79.0 HYPERURICEMIA: ICD-10-CM

## 2019-02-05 DIAGNOSIS — I10 ESSENTIAL HYPERTENSION: ICD-10-CM

## 2019-02-05 DIAGNOSIS — E11.22 TYPE 2 DIABETES MELLITUS WITH STAGE 3 CHRONIC KIDNEY DISEASE, WITH LONG-TERM CURRENT USE OF INSULIN (HCC): ICD-10-CM

## 2019-02-05 DIAGNOSIS — N18.30 CHRONIC RENAL FAILURE, STAGE 3 (MODERATE) (HCC): ICD-10-CM

## 2019-02-05 DIAGNOSIS — Z79.4 TYPE 2 DIABETES MELLITUS WITH STAGE 3 CHRONIC KIDNEY DISEASE, WITH LONG-TERM CURRENT USE OF INSULIN (HCC): Primary | ICD-10-CM

## 2019-02-05 PROCEDURE — 99213 OFFICE O/P EST LOW 20 MIN: CPT | Performed by: INTERNAL MEDICINE

## 2019-02-05 RX ORDER — ALLOPURINOL 100 MG/1
200 TABLET ORAL DAILY
Qty: 180 TABLET | Refills: 3 | Status: SHIPPED | OUTPATIENT
Start: 2019-02-05 | End: 2020-07-09 | Stop reason: SDUPTHER

## 2019-02-05 RX ORDER — OXYCODONE HYDROCHLORIDE AND ACETAMINOPHEN 5; 325 MG/1; MG/1
1 TABLET ORAL EVERY 8 HOURS PRN
Qty: 120 TABLET | Refills: 0 | Status: SHIPPED | OUTPATIENT
Start: 2019-02-05 | End: 2019-03-08 | Stop reason: SDUPTHER

## 2019-03-07 RX ORDER — TIZANIDINE HYDROCHLORIDE 2 MG/1
CAPSULE, GELATIN COATED ORAL
Qty: 180 CAPSULE | Refills: 3 | Status: SHIPPED | OUTPATIENT
Start: 2019-03-07 | End: 2019-03-08 | Stop reason: SDUPTHER

## 2019-03-08 DIAGNOSIS — M48.061 SPINAL STENOSIS OF LUMBAR REGION WITHOUT NEUROGENIC CLAUDICATION: ICD-10-CM

## 2019-03-08 RX ORDER — TIZANIDINE HYDROCHLORIDE 2 MG/1
CAPSULE, GELATIN COATED ORAL
Qty: 180 CAPSULE | Refills: 3 | Status: SHIPPED | OUTPATIENT
Start: 2019-03-08 | End: 2020-04-01 | Stop reason: SDUPTHER

## 2019-03-08 RX ORDER — OXYCODONE HYDROCHLORIDE AND ACETAMINOPHEN 5; 325 MG/1; MG/1
1 TABLET ORAL EVERY 8 HOURS PRN
Qty: 120 TABLET | Refills: 0 | Status: SHIPPED | OUTPATIENT
Start: 2019-03-08 | End: 2019-04-05 | Stop reason: SDUPTHER

## 2019-04-01 ENCOUNTER — CARE COORDINATION (OUTPATIENT)
Dept: CARE COORDINATION | Age: 74
End: 2019-04-01

## 2019-04-01 NOTE — CARE COORDINATION
Ambulatory Care Coordination Note  4/1/2019  CM Risk Score: 3  Alondra Mortality Risk Score: 8    ACC: José Luis Hooper, HUGO    Summary Note: ACC spoke with pt and pt's spouse, Leif Jonas on speaker phone. ACC inquired what range pt's blood sugars have been running, wife states, 'They are all over the place. ..170 - 350. Pt states it doesn't matter what I eat, my sugars are all over the place. ACC offered diabetic class with dietician on April 16th or May 21 and he is declining for now. Plan:  Pt has an office visit scheduled for this Friday at 2. CC will meet face to face. José Luis Hooper RN  Ambulatory Care Coordinator  [phone]  [email or web address]        Care Coordination Interventions    Program Enrollment:  Rising Risk  Referral from Primary Care Provider:  No  Suggested Interventions and Community Resources  Diabetes Education: In Process (Comment: 4-1-19 Pt and spouse not very engaged today. Will Con't to try to educate on diabetic diet. )  Registered Dietician:  Declined (Comment: 4/1/19 Offered diabetic nutrition classes in April and May, states not interested.)  Zone Management Tools:  Completed (Comment: 4-1-19 DM Zone Tool sent)         Goals Addressed                 This Visit's Progress     Self Monitoring   No change     Self-Monitored Blood Glucose - I will check my blood sugar blood sugars ac & HS  I will notify my provider of any trends of increasing or decreasing blood sugars over a 1 month period. I will notify my provider if I have any blood sugar readings less than 70 more than 2 times a month. Patient Reported Blood Glucose No flowsheet data found. Barriers: overwhelmed by complexity of regimen  Plan for overcoming my barriers: Pt and ACC will work together on his diet and go over blood sugar diary to trouble shoot inconsistent blood sugars. Confidence: 7/10  Anticipated Goal Completion Date: 4/5/19              Prior to Admission medications    Medication Sig Start Date End Date Taking? Authorizing Provider   tiZANidine (ZANAFLEX) 2 MG capsule TAKE 1 CAPSULE TWICE A DAY AS NEEDED FOR MUSCLE SPASMS 3/8/19   Ángela Garzon MD   oxyCODONE-acetaminophen (PERCOCET) 5-325 MG per tablet Take 1 tablet by mouth every 8 hours as needed for Pain for up to 30 days. May go to 1 every 6 hrs as needed for extreme pain. 3/8/19 4/7/19  Ángela Garzon MD   allopurinol (ZYLOPRIM) 100 MG tablet Take 2 tablets by mouth daily 2/5/19   Ángela Garzon MD   metolazone (ZAROXOLYN) 2.5 MG tablet Take one tablet once a week. Take 30 minutes before the Lasix.  1/18/19   Ángela Garzon MD   gabapentin (NEURONTIN) 300 MG capsule TAKE 1 CAPSULE IN THE AFTERNOON AND 2 CAPSULES AT BEDTIME 1/18/19 1/18/20  Ángela Garzon MD   LORazepam (ATIVAN) 0.5 MG tablet TAKE 1 TABLET BY MOUTH EVERY NIGHT AT BEDTIME AS NEEDED FOR ANXIETY 1/10/19 4/10/19  Ángela Garzon MD   HUMALOG 100 UNIT/ML injection vial INJECT 6 TO 8 UNITS (VARIES) WITH MEALS TWICE A DAY 12/27/18   Ángela Garzon MD   Insulin Syringe-Needle U-100 (B-D INS SYR ULTRAFINE .5CC/30G) 30G X 1/2\" 0.5 ML MISC USE TWICE A DAY OR AS NEEDED AS DIRECTED 12/6/18   Ángela Garzon MD   traZODone (DESYREL) 100 MG tablet TAKE 1 TABLET NIGHTLY 10/29/18   Ángela Garzon MD   potassium chloride (KLOR-CON M) 20 MEQ extended release tablet TAKE 1 TABLET TWICE A DAY 10/29/18   Ángela Garzon MD   omeprazole (PRILOSEC) 40 MG delayed release capsule TAKE 1 CAPSULE DAILY 10/12/18   Ángela Garzon MD   CARTIA  MG extended release capsule TAKE 1 CAPSULE DAILY 9/13/18   Ángela Garzon MD   hydrochlorothiazide (HYDRODIURIL) 25 MG tablet TAKE 1 TABLET DAILY 9/13/18   Ángela Garzon MD   carbidopa-levodopa (SINEMET)  MG per tablet TAKE ONE AND ONE-HALF TABLETS NIGHTLY 9/13/18   Ángela Garzon MD   nitroGLYCERIN (NITROSTAT) 0.4 MG SL tablet Place 1 tablet under the tongue every 5 minutes as needed for Chest pain 9/11/18 9/11/19  Susanne Tracey MD atorvastatin (LIPITOR) 20 MG tablet TAKE 1 TABLET DAILY 8/21/18   Pierce Dyer MD   furosemide (LASIX) 40 MG tablet TAKE 1 TABLET DAILY 8/21/18   Pierce Dyer MD   sertraline (ZOLOFT) 50 MG tablet TAKE 2 TABLETS DAILY 8/21/18   Pierce Dyer MD   DULoxetine (CYMBALTA) 30 MG extended release capsule TAKE 1 CAPSULE DAILY 6/12/18   Pierce Dyer MD   HUMULIN N 100 UNIT/ML injection vial INJECT 35 UNITS IN THE MORNING AND 10 UNITS AT SUPPER 1/15/18   Pierce Dyer MD   timolol (TIMOPTIC) 0.5 % ophthalmic solution Apply 1 drop to eye daily 6/3/17   Keny Davila MD   Probiotic Product (ALIGN PO) Take 1 tablet by mouth daily    Historical Provider, MD   Simethicone (GAS-X PO) Take 2 tablets by mouth Three to four times daily as needed    Historical Provider, MD   dicyclomine (BENTYL) 10 MG capsule Take 1 capsule by mouth 4 times daily as needed (abdominal pain) 6/6/17   Valarie Palma MD   Glucose Blood (BLOOD GLUCOSE TEST STRIPS) STRP MyDROBE Contour glucose test strips-uses 4x per day or prn with sliding scale coverage   Dx:250.01 Type 1 Diabetes 6/22/15   Pierce Dyer MD   mupirocin (BACTROBAN) 2 % ointment Apply topically 3 times daily. 5/27/15   Pierce Dyer MD   ammonium lactate (LAC-HYDRIN) 12 % lotion Apply to nails, trunk, arms, legs BID 3/16/15   Milind Reynolds, DO   ciclopirox (LOPROX) 0.77 % cream Apply to legs, buttocks BID PRN flares 1/5/15   Milind Reynolds, DO   ZACH MICROLET LANCETS MISC Tests 4 to 5 times daily or prn 11/13/14   Pierce Dyer MD   Polyethylene Glycol 3350 (MIRALAX PO) Take 1 capsule by mouth as needed     Historical Provider, MD   B Complex-Biotin-FA (VITAMIN B50 COMPLEX PO) Take 1 tablet by mouth daily. Historical Provider, MD   Vitamin D (CHOLECALCIFEROL) 1000 UNITS CAPS capsule Take 1,000 Units by mouth daily. Historical Provider, MD   latanoprost (XALATAN) 0.005 % ophthalmic solution Place 1 drop into both eyes daily.

## 2019-04-05 ENCOUNTER — OFFICE VISIT (OUTPATIENT)
Dept: INTERNAL MEDICINE CLINIC | Age: 74
End: 2019-04-05
Payer: MEDICARE

## 2019-04-05 VITALS
HEART RATE: 72 BPM | SYSTOLIC BLOOD PRESSURE: 134 MMHG | RESPIRATION RATE: 15 BRPM | DIASTOLIC BLOOD PRESSURE: 80 MMHG | BODY MASS INDEX: 41.38 KG/M2 | WEIGHT: 219 LBS

## 2019-04-05 DIAGNOSIS — Z79.4 TYPE 2 DIABETES MELLITUS WITH STAGE 3 CHRONIC KIDNEY DISEASE, WITH LONG-TERM CURRENT USE OF INSULIN (HCC): ICD-10-CM

## 2019-04-05 DIAGNOSIS — N18.30 TYPE 2 DIABETES MELLITUS WITH STAGE 3 CHRONIC KIDNEY DISEASE, WITH LONG-TERM CURRENT USE OF INSULIN (HCC): Primary | ICD-10-CM

## 2019-04-05 DIAGNOSIS — E11.22 TYPE 2 DIABETES MELLITUS WITH STAGE 3 CHRONIC KIDNEY DISEASE, WITH LONG-TERM CURRENT USE OF INSULIN (HCC): ICD-10-CM

## 2019-04-05 DIAGNOSIS — E11.22 TYPE 2 DIABETES MELLITUS WITH STAGE 3 CHRONIC KIDNEY DISEASE, WITH LONG-TERM CURRENT USE OF INSULIN (HCC): Primary | ICD-10-CM

## 2019-04-05 DIAGNOSIS — M48.061 SPINAL STENOSIS OF LUMBAR REGION WITHOUT NEUROGENIC CLAUDICATION: Primary | ICD-10-CM

## 2019-04-05 DIAGNOSIS — N18.30 CHRONIC RENAL FAILURE, STAGE 3 (MODERATE) (HCC): ICD-10-CM

## 2019-04-05 DIAGNOSIS — E03.4 HYPOTHYROIDISM DUE TO ACQUIRED ATROPHY OF THYROID: ICD-10-CM

## 2019-04-05 DIAGNOSIS — Z79.4 TYPE 2 DIABETES MELLITUS WITH STAGE 3 CHRONIC KIDNEY DISEASE, WITH LONG-TERM CURRENT USE OF INSULIN (HCC): Primary | ICD-10-CM

## 2019-04-05 DIAGNOSIS — I10 ESSENTIAL HYPERTENSION: ICD-10-CM

## 2019-04-05 DIAGNOSIS — N18.30 TYPE 2 DIABETES MELLITUS WITH STAGE 3 CHRONIC KIDNEY DISEASE, WITH LONG-TERM CURRENT USE OF INSULIN (HCC): ICD-10-CM

## 2019-04-05 DIAGNOSIS — E78.2 MIXED HYPERLIPIDEMIA: ICD-10-CM

## 2019-04-05 PROCEDURE — 99213 OFFICE O/P EST LOW 20 MIN: CPT | Performed by: INTERNAL MEDICINE

## 2019-04-05 RX ORDER — OXYCODONE HYDROCHLORIDE AND ACETAMINOPHEN 5; 325 MG/1; MG/1
1 TABLET ORAL EVERY 8 HOURS PRN
Qty: 120 TABLET | Refills: 0 | Status: SHIPPED | OUTPATIENT
Start: 2019-04-05 | End: 2019-06-03 | Stop reason: SDUPTHER

## 2019-04-05 ASSESSMENT — PATIENT HEALTH QUESTIONNAIRE - PHQ9
2. FEELING DOWN, DEPRESSED OR HOPELESS: 0
SUM OF ALL RESPONSES TO PHQ9 QUESTIONS 1 & 2: 0
SUM OF ALL RESPONSES TO PHQ QUESTIONS 1-9: 0
SUM OF ALL RESPONSES TO PHQ QUESTIONS 1-9: 0
1. LITTLE INTEREST OR PLEASURE IN DOING THINGS: 0

## 2019-04-10 ENCOUNTER — CARE COORDINATION (OUTPATIENT)
Dept: CARE COORDINATION | Age: 74
End: 2019-04-10

## 2019-04-11 NOTE — CARE COORDINATION
ONE-HALF TABLETS NIGHTLY 9/13/18   Bertram Campa MD   nitroGLYCERIN (NITROSTAT) 0.4 MG SL tablet Place 1 tablet under the tongue every 5 minutes as needed for Chest pain 9/11/18 9/11/19  Ray Gupta MD   atorvastatin (LIPITOR) 20 MG tablet TAKE 1 TABLET DAILY 8/21/18   Bertram Campa MD   furosemide (LASIX) 40 MG tablet TAKE 1 TABLET DAILY 8/21/18   Bertram Campa MD   sertraline (ZOLOFT) 50 MG tablet TAKE 2 TABLETS DAILY 8/21/18   Bertram Campa MD   DULoxetine (CYMBALTA) 30 MG extended release capsule TAKE 1 CAPSULE DAILY 6/12/18   Bertram Campa MD   HUMULIN N 100 UNIT/ML injection vial INJECT 35 UNITS IN THE MORNING AND 10 UNITS AT SUPPER 1/15/18   Bertram Campa MD   timolol (TIMOPTIC) 0.5 % ophthalmic solution Apply 1 drop to eye daily 6/3/17   Syd Burk MD   Probiotic Product (ALIGN PO) Take 1 tablet by mouth daily    Historical Provider, MD   Simethicone (GAS-X PO) Take 2 tablets by mouth Three to four times daily as needed    Historical Provider, MD   dicyclomine (BENTYL) 10 MG capsule Take 1 capsule by mouth 4 times daily as needed (abdominal pain) 6/6/17   Mary Ellen Palma MD   Glucose Blood (BLOOD GLUCOSE TEST STRIPS) STRP Zach Contour glucose test strips-uses 4x per day or prn with sliding scale coverage   Dx:250.01 Type 1 Diabetes 6/22/15   Bertram Campa MD   mupirocin (BACTROBAN) 2 % ointment Apply topically 3 times daily. 5/27/15   Bertram Campa MD   ammonium lactate (LAC-HYDRIN) 12 % lotion Apply to nails, trunk, arms, legs BID 3/16/15   Hailey Finley DO   ciclopirox (LOPROX) 0.77 % cream Apply to legs, buttocks BID PRN flares 1/5/15   Hailey Finley DO   ZACH MICROLET LANCETS MISC Tests 4 to 5 times daily or prn 11/13/14   Bertram Campa MD   Polyethylene Glycol 3350 (MIRALAX PO) Take 1 capsule by mouth as needed     Historical Provider, MD   B Complex-Biotin-FA (VITAMIN B50 COMPLEX PO) Take 1 tablet by mouth daily.     Historical Provider, MD   Vitamin D (CHOLECALCIFEROL) 1000 UNITS CAPS capsule Take 1,000 Units by mouth daily. Historical Provider, MD   latanoprost (XALATAN) 0.005 % ophthalmic solution Place 1 drop into both eyes daily. Historical Provider, MD   docusate sodium (COLACE) 100 MG capsule Take 100 mg by mouth 2 times daily     Historical Provider, MD       Future Appointments   Date Time Provider Christina Elena   6/14/2019  2:00 PM Annika Borrego [facility]   9/12/2019 11:10 AM Cedric Bauman MD [facility]   1/24/2020  9:00 AM Марина Wharton MD [address]   2/3/2020 11:30 AM Марина Wharton MD [facility]      and   Diabetes Assessment    Medic Alert ID:  No  Meal Planning:  Avoidance of concentrated sweets   How often do you test your blood sugar?:  Meals, Bedtime   Do you have barriers with adherence to non-pharmacologic self-management interventions?  (Nutrition/Exercise/Self-Monitoring):  Yes   Have you ever had to go to the ED for symptoms of low blood sugar?:  No       Blood Sugar Trends:   (Comment: Souse states she thinks pt was around 150 this morning)

## 2019-04-18 ENCOUNTER — OFFICE VISIT (OUTPATIENT)
Dept: INTERNAL MEDICINE CLINIC | Age: 74
End: 2019-04-18
Payer: MEDICARE

## 2019-04-18 VITALS — SYSTOLIC BLOOD PRESSURE: 118 MMHG | RESPIRATION RATE: 15 BRPM | HEART RATE: 64 BPM | DIASTOLIC BLOOD PRESSURE: 64 MMHG

## 2019-04-18 DIAGNOSIS — I25.10 ASHD (ARTERIOSCLEROTIC HEART DISEASE): ICD-10-CM

## 2019-04-18 DIAGNOSIS — I10 ESSENTIAL HYPERTENSION: ICD-10-CM

## 2019-04-18 DIAGNOSIS — N18.30 CHRONIC RENAL FAILURE, STAGE 3 (MODERATE) (HCC): ICD-10-CM

## 2019-04-18 DIAGNOSIS — E03.4 HYPOTHYROIDISM DUE TO ACQUIRED ATROPHY OF THYROID: ICD-10-CM

## 2019-04-18 DIAGNOSIS — G47.33 OBSTRUCTIVE SLEEP APNEA SYNDROME: Primary | ICD-10-CM

## 2019-04-18 LAB
ALBUMIN SERPL-MCNC: 3.5 G/DL (ref 3.4–5)
ALP BLD-CCNC: 95 U/L (ref 40–129)
ALT SERPL-CCNC: 10 U/L (ref 10–40)
ANION GAP SERPL CALCULATED.3IONS-SCNC: 11 MMOL/L (ref 3–16)
AST SERPL-CCNC: 13 U/L (ref 15–37)
BILIRUB SERPL-MCNC: 0.4 MG/DL (ref 0–1)
BILIRUBIN DIRECT: <0.2 MG/DL (ref 0–0.3)
BILIRUBIN, INDIRECT: ABNORMAL MG/DL (ref 0–1)
BUN BLDV-MCNC: 16 MG/DL (ref 7–20)
CALCIUM SERPL-MCNC: 8.7 MG/DL (ref 8.3–10.6)
CHLORIDE BLD-SCNC: 96 MMOL/L (ref 99–110)
CO2: 28 MMOL/L (ref 21–32)
CREAT SERPL-MCNC: 1.5 MG/DL (ref 0.8–1.3)
GFR AFRICAN AMERICAN: 55
GFR NON-AFRICAN AMERICAN: 46
GLUCOSE BLD-MCNC: 184 MG/DL (ref 70–99)
HCT VFR BLD CALC: 43.8 % (ref 40.5–52.5)
HEMOGLOBIN: 14.5 G/DL (ref 13.5–17.5)
MCH RBC QN AUTO: 31.5 PG (ref 26–34)
MCHC RBC AUTO-ENTMCNC: 33.2 G/DL (ref 31–36)
MCV RBC AUTO: 94.8 FL (ref 80–100)
PDW BLD-RTO: 14.4 % (ref 12.4–15.4)
PLATELET # BLD: 210 K/UL (ref 135–450)
PMV BLD AUTO: 7.5 FL (ref 5–10.5)
POTASSIUM SERPL-SCNC: 3.8 MMOL/L (ref 3.5–5.1)
RBC # BLD: 4.62 M/UL (ref 4.2–5.9)
SODIUM BLD-SCNC: 135 MMOL/L (ref 136–145)
TOTAL PROTEIN: 6.1 G/DL (ref 6.4–8.2)
TSH SERPL DL<=0.05 MIU/L-ACNC: 2.87 UIU/ML (ref 0.27–4.2)
WBC # BLD: 6.7 K/UL (ref 4–11)

## 2019-04-18 PROCEDURE — 99213 OFFICE O/P EST LOW 20 MIN: CPT | Performed by: INTERNAL MEDICINE

## 2019-04-18 NOTE — PROGRESS NOTES
S: Patient presents with problems of CRF, s/p nephrectomy, Diabetes mellitus, ASHD, HTN, and lumbar spinal stenosis causing chronic low back pain. His Wife is present. He is requesting a refill on his Percocet qid prn for chronic low back pain from lumbar spinal stenosis. He is also receiving lumbar epidural steroid injections on a regular basis with Dr Sulaiman Juan. His glucoses have been fluctuating with the steroid injections. No polyuria or polydipsia. No headache, chest pain, or breathing difficulties. No falls or daytime somnolence. O:Blood pressure 134/80, pulse 72, resp. rate 15, weight 219 lb (99.3 kg). Neck: No palpable lymph nodes, normal thyroid examination. Lungs: clear      Cardio: reg pulse, grade I systolic murmur      Abd: non tender      Back: tenderness and spasms of lumbar paraspinal muscles      Ext: bilateral lower ext pitting edema, no ulcers         LABS: from 3/26/19 CBC normal, Na 134 K 4.0 Cl 92 BUN 21 Creat 1.9 Glucose 422, Hgba1c 8.3%, Liver & CK normal, LDL 68 HDL 75 Trig 116    A: Venous insufficiency of lower exts      HTN controlled      CRF stable s/p nephrectomy       Diabetes mellitus on insulin therapy, no controlled       Lumbar spinal stenosis with chronic pain on regular epidural steroid injections & Percocet prn       Hypertension controlled      BMI 41    P: Copy of labs given       Continue present medications       Intensify low fat low carb low salt diet       Continue support stockings       Refilled Percocet 1 tab q6hrs prn #120 NR       OV in 2M with Basic chem Hgba1c lipid liver cpk cbc tsh      Controlled Substances Monitoring:     RX Monitoring 4/5/2019   Attestation The Prescription Monitoring Report for this patient was reviewed today.    Chronic Pain Routine Monitoring No signs of potential drug abuse or diversion identified: otherwise, see note documentation

## 2019-05-29 RX ORDER — INSULIN HUMAN 100 [IU]/ML
INJECTION, SUSPENSION SUBCUTANEOUS
Qty: 50 ML | Refills: 3 | Status: SHIPPED | OUTPATIENT
Start: 2019-05-29 | End: 2020-03-06 | Stop reason: SDUPTHER

## 2019-06-03 DIAGNOSIS — M48.061 SPINAL STENOSIS OF LUMBAR REGION WITHOUT NEUROGENIC CLAUDICATION: ICD-10-CM

## 2019-06-03 DIAGNOSIS — F41.1 GENERALIZED ANXIETY DISORDER: ICD-10-CM

## 2019-06-03 RX ORDER — LORAZEPAM 0.5 MG/1
0.5 TABLET ORAL NIGHTLY PRN
Qty: 90 TABLET | Refills: 0 | Status: SHIPPED | OUTPATIENT
Start: 2019-06-03 | End: 2019-09-11 | Stop reason: SDUPTHER

## 2019-06-03 RX ORDER — OXYCODONE HYDROCHLORIDE AND ACETAMINOPHEN 5; 325 MG/1; MG/1
1 TABLET ORAL EVERY 8 HOURS PRN
Qty: 120 TABLET | Refills: 0 | Status: SHIPPED | OUTPATIENT
Start: 2019-06-03 | End: 2019-07-26 | Stop reason: SDUPTHER

## 2019-06-13 RX ORDER — DULOXETIN HYDROCHLORIDE 30 MG/1
CAPSULE, DELAYED RELEASE ORAL
Qty: 90 CAPSULE | Refills: 3 | Status: SHIPPED | OUTPATIENT
Start: 2019-06-13 | End: 2020-08-07 | Stop reason: SDUPTHER

## 2019-06-14 ENCOUNTER — OFFICE VISIT (OUTPATIENT)
Dept: INTERNAL MEDICINE CLINIC | Age: 74
End: 2019-06-14
Payer: MEDICARE

## 2019-06-14 VITALS
DIASTOLIC BLOOD PRESSURE: 70 MMHG | WEIGHT: 219 LBS | SYSTOLIC BLOOD PRESSURE: 130 MMHG | HEART RATE: 74 BPM | BODY MASS INDEX: 41.38 KG/M2

## 2019-06-14 DIAGNOSIS — E78.2 MIXED HYPERLIPIDEMIA: ICD-10-CM

## 2019-06-14 DIAGNOSIS — E11.22 TYPE 2 DIABETES MELLITUS WITH STAGE 3 CHRONIC KIDNEY DISEASE, WITH LONG-TERM CURRENT USE OF INSULIN (HCC): ICD-10-CM

## 2019-06-14 DIAGNOSIS — Z79.4 TYPE 2 DIABETES MELLITUS WITH STAGE 3 CHRONIC KIDNEY DISEASE, WITH LONG-TERM CURRENT USE OF INSULIN (HCC): Primary | ICD-10-CM

## 2019-06-14 DIAGNOSIS — N18.30 TYPE 2 DIABETES MELLITUS WITH STAGE 3 CHRONIC KIDNEY DISEASE, WITH LONG-TERM CURRENT USE OF INSULIN (HCC): Primary | ICD-10-CM

## 2019-06-14 DIAGNOSIS — Z79.4 TYPE 2 DIABETES MELLITUS WITH STAGE 3 CHRONIC KIDNEY DISEASE, WITH LONG-TERM CURRENT USE OF INSULIN (HCC): ICD-10-CM

## 2019-06-14 DIAGNOSIS — I10 ESSENTIAL HYPERTENSION: ICD-10-CM

## 2019-06-14 DIAGNOSIS — E79.0 HYPERURICEMIA: ICD-10-CM

## 2019-06-14 DIAGNOSIS — I87.2 VENOUS INSUFFICIENCY OF BOTH LOWER EXTREMITIES: ICD-10-CM

## 2019-06-14 DIAGNOSIS — N18.30 CHRONIC RENAL FAILURE, STAGE 3 (MODERATE) (HCC): Primary | ICD-10-CM

## 2019-06-14 DIAGNOSIS — N18.30 TYPE 2 DIABETES MELLITUS WITH STAGE 3 CHRONIC KIDNEY DISEASE, WITH LONG-TERM CURRENT USE OF INSULIN (HCC): ICD-10-CM

## 2019-06-14 DIAGNOSIS — E11.22 TYPE 2 DIABETES MELLITUS WITH STAGE 3 CHRONIC KIDNEY DISEASE, WITH LONG-TERM CURRENT USE OF INSULIN (HCC): Primary | ICD-10-CM

## 2019-06-14 DIAGNOSIS — M48.061 SPINAL STENOSIS OF LUMBAR REGION WITHOUT NEUROGENIC CLAUDICATION: ICD-10-CM

## 2019-06-14 PROCEDURE — 99214 OFFICE O/P EST MOD 30 MIN: CPT | Performed by: INTERNAL MEDICINE

## 2019-06-24 NOTE — PROGRESS NOTES
S: Patient presents with problems of CRF, s/p nephrectomy, Diabetes mellitus, ASHD, HTN, and lumbar spinal stenosis causing chronic low back pain. His Wife is present. His diabetic control is labile complicated with his multiple medical problems. He is trying to follow his low carb low fat low salt diet. Glucoses at home range from 140 to 200 with no hypoglycemic episodes. No headache, chest pain, or breathing difficulties. No palpitations, dizziness, or syncope. He continues on his chronic pain medications for his lumbar spinal stenosis. No falls or daytime somnolence. No polyuria or polydipsia. O:Blood pressure 130/70, pulse 74, weight 219 lb (99.3 kg). Neck: No palpable lymph nodes, normal thyroid examination.       Lungs: clear      Cardio: reg pulse, grade I systolic murmur      Abd: non tender      Back: tenderness and spasms of lumbar paraspinal muscles      Ext: bilateral lower ext pitting edema, no ulcers         LABS: from 6/4/19 CBC normal, Na 135 K 3.8 Cl 93 BUN 20 Creat 1.6, Glucose 108, Liver & CK normal, Hgba1c 8.6%, TSH 4.5, LDL 76 HDL 77 Trig 92      A: Venous insufficiency of lower exts      HTN controlled      CRF stable s/p nephrectomy       Diabetes mellitus on insulin therapy       Lumbar spinal stenosis with chronic pain on regular epidural steroid injections & Percocet prn       Hypertension controlled      Hyperlipidemia controlled on Lipitor       BMI 41    P: Copy of labs given       Continue present medications       Intensify low fat low carb low salt diet       Continue support stockings       OV in 2M with Basic chem Hgba1c lipid liver cpk cbc

## 2019-07-26 DIAGNOSIS — M48.061 SPINAL STENOSIS OF LUMBAR REGION WITHOUT NEUROGENIC CLAUDICATION: ICD-10-CM

## 2019-07-26 RX ORDER — OXYCODONE HYDROCHLORIDE AND ACETAMINOPHEN 5; 325 MG/1; MG/1
1 TABLET ORAL EVERY 8 HOURS PRN
Qty: 120 TABLET | Refills: 0 | Status: SHIPPED | OUTPATIENT
Start: 2019-07-26 | End: 2019-08-25

## 2019-08-19 ENCOUNTER — OFFICE VISIT (OUTPATIENT)
Dept: INTERNAL MEDICINE CLINIC | Age: 74
End: 2019-08-19
Payer: MEDICARE

## 2019-08-19 VITALS
HEART RATE: 76 BPM | BODY MASS INDEX: 42.14 KG/M2 | WEIGHT: 223 LBS | SYSTOLIC BLOOD PRESSURE: 130 MMHG | DIASTOLIC BLOOD PRESSURE: 68 MMHG

## 2019-08-19 DIAGNOSIS — E11.610 TYPE 2 DIABETES MELLITUS WITH DIABETIC NEUROPATHIC ARTHROPATHY, WITH LONG-TERM CURRENT USE OF INSULIN (HCC): Primary | ICD-10-CM

## 2019-08-19 DIAGNOSIS — E11.22 TYPE 2 DIABETES MELLITUS WITH STAGE 3 CHRONIC KIDNEY DISEASE, WITH LONG-TERM CURRENT USE OF INSULIN (HCC): Primary | ICD-10-CM

## 2019-08-19 DIAGNOSIS — I10 ESSENTIAL HYPERTENSION: ICD-10-CM

## 2019-08-19 DIAGNOSIS — N18.30 CHRONIC RENAL FAILURE, STAGE 3 (MODERATE) (HCC): ICD-10-CM

## 2019-08-19 DIAGNOSIS — Z79.4 TYPE 2 DIABETES MELLITUS WITH STAGE 3 CHRONIC KIDNEY DISEASE, WITH LONG-TERM CURRENT USE OF INSULIN (HCC): Primary | ICD-10-CM

## 2019-08-19 DIAGNOSIS — E79.0 HYPERURICEMIA: ICD-10-CM

## 2019-08-19 DIAGNOSIS — M48.061 SPINAL STENOSIS OF LUMBAR REGION WITHOUT NEUROGENIC CLAUDICATION: ICD-10-CM

## 2019-08-19 DIAGNOSIS — N18.30 TYPE 2 DIABETES MELLITUS WITH STAGE 3 CHRONIC KIDNEY DISEASE, WITH LONG-TERM CURRENT USE OF INSULIN (HCC): Primary | ICD-10-CM

## 2019-08-19 DIAGNOSIS — Z79.4 TYPE 2 DIABETES MELLITUS WITH DIABETIC NEUROPATHIC ARTHROPATHY, WITH LONG-TERM CURRENT USE OF INSULIN (HCC): Primary | ICD-10-CM

## 2019-08-19 DIAGNOSIS — I87.2 VENOUS INSUFFICIENCY OF BOTH LOWER EXTREMITIES: ICD-10-CM

## 2019-08-19 DIAGNOSIS — E78.2 MIXED HYPERLIPIDEMIA: ICD-10-CM

## 2019-08-19 PROCEDURE — 99214 OFFICE O/P EST MOD 30 MIN: CPT | Performed by: INTERNAL MEDICINE

## 2019-08-19 RX ORDER — OXYCODONE HYDROCHLORIDE AND ACETAMINOPHEN 5; 325 MG/1; MG/1
1 TABLET ORAL EVERY 8 HOURS PRN
Qty: 120 TABLET | Refills: 0 | Status: SHIPPED | OUTPATIENT
Start: 2019-08-19 | End: 2019-09-30 | Stop reason: SDUPTHER

## 2019-08-19 RX ORDER — METOLAZONE 2.5 MG/1
TABLET ORAL
Qty: 4 TABLET | Refills: 5 | Status: SHIPPED | OUTPATIENT
Start: 2019-08-19

## 2019-08-23 RX ORDER — FLASH GLUCOSE SENSOR
KIT MISCELLANEOUS
Qty: 1 EACH | Refills: 0 | Status: SHIPPED | OUTPATIENT
Start: 2019-08-23 | End: 2020-11-12 | Stop reason: SDUPTHER

## 2019-08-27 NOTE — PROGRESS NOTES
S: Patient presents with problems of CRF, s/p nephrectomy, Diabetes mellitus, ASHD, HTN, and lumbar spinal stenosis causing chronic low back pain. His Wife is present. His diabetic control is labile complicated with his multiple medical problems. He is interested in obtaining the 14 day monitoring system of [employer]. He is trying to follow his low carb low fat low salt diet. Glucoses at home range from 140 to 200 with no hypoglycemic episodes. No headache, chest pain, or breathing difficulties. No palpitations, dizziness, or syncope. He continues on his chronic pain medications for his lumbar spinal stenosis. He is using Percocet prn for pain control with no daytime somnolence. He is noting more lower ext edema. O:Blood pressure 130/68, pulse 76, weight 223 lb (101.2 kg). Neck: No palpable lymph nodes, normal thyroid examination.       Lungs: clear      Cardio: reg pulse, grade I systolic murmur      Abd: non tender      Back: tenderness and spasms of lumbar paraspinal muscles      Ext: bilateral lower ext pitting edema, no ulcers         LABS: from 8/9/19 CBC normal, Glucose 324, Na 132 K 3.8 Cl 92, BUN 23 Creat 1.8, Liver & CK normal, LDL 63 HDL 65 Trig 120, Hgba1c 8.0%     A: Venous insufficiency of lower exts      HTN controlled      CRF stable s/p nephrectomy       Diabetes mellitus on insulin therapy with improving Hgba1c       Lumbar spinal stenosis with chronic pain on regular epidural steroid injections & Percocet prn       Hypertension controlled      Hyperlipidemia controlled on Lipitor       BMI 41    P: Copy of labs given       Continue present medications       Intensify low fat low carb low salt diet       Continue support stockings       Zaroxolyn 2.5 mg to once a week as directed       Refilled Percocet 5 1 q6hr prn pain #120 NR       [employer] order with 14 day monitoring       OV in 2M with Basic chem Hgba1c lipid liver cpk cbc

## 2019-09-11 DIAGNOSIS — F41.1 GENERALIZED ANXIETY DISORDER: ICD-10-CM

## 2019-09-11 RX ORDER — LORAZEPAM 0.5 MG/1
0.5 TABLET ORAL NIGHTLY PRN
Qty: 90 TABLET | Refills: 0 | Status: SHIPPED | OUTPATIENT
Start: 2019-09-11 | End: 2019-09-30 | Stop reason: SDUPTHER

## 2019-09-11 NOTE — TELEPHONE ENCOUNTER
Controlled Substance Monitoring:    Acute and Chronic Pain Monitoring:   RX Monitoring 9/11/2019   Attestation -   Periodic Controlled Substance Monitoring No signs of potential drug abuse or diversion identified.

## 2019-09-12 ENCOUNTER — OFFICE VISIT (OUTPATIENT)
Dept: CARDIOLOGY CLINIC | Age: 74
End: 2019-09-12
Payer: MEDICARE

## 2019-09-12 VITALS
SYSTOLIC BLOOD PRESSURE: 138 MMHG | WEIGHT: 228 LBS | DIASTOLIC BLOOD PRESSURE: 70 MMHG | HEIGHT: 64 IN | HEART RATE: 66 BPM | BODY MASS INDEX: 38.93 KG/M2

## 2019-09-12 DIAGNOSIS — Z98.61 HISTORY OF PTCA: Primary | ICD-10-CM

## 2019-09-12 PROCEDURE — 99214 OFFICE O/P EST MOD 30 MIN: CPT | Performed by: INTERNAL MEDICINE

## 2019-09-12 NOTE — PROGRESS NOTES
CARDIOLOGY NOTE      9/12/2019    RE: Kimberly Starks  ([birth year])                               TO:  Dr. Author Jaspreet MD            Justice Higgins is a 75 y.o. male who was seen today for management of  CAD                                    HPI:   Patient is here for    - Coronary artery disease, does not have chest pain. Patient is  compliant with prescribed medicines. - Hypertension,is  well controlled, pt is  compliant with medicines  - Hyperlipidimea, lipids are in acceptable range. Pt  is  compliant with medicines                  The patient does not have cardiac complaints    Kimberly Starks has the following history recorded in care path:  Patient Active Problem List    Diagnosis Date Noted    Chronic pain 09/01/2017    Right upper quadrant abdominal pain 06/06/2017    Diaphoresis 06/06/2017    Fracture of four ribs 01/06/2017    Diabetic peripheral neuropathy ([facility] [address] 10/07/2015    Cancer of ureter ([facility] [address] 09/30/2015    Chronic peripheral venous hypertension 09/30/2015    Morbid obesity ([facility] [address] 09/30/2015    Controlled substance agreement signed 07/16/2015    CAD (coronary artery disease) 09/25/2013    Appendicitis 07/23/2013    CRF (chronic renal failure) 04/16/2013    Type 2 diabetes mellitus ([facility] [address] 02/01/2013    Renal cancer ([facility] [address] 06/14/2012    ASHD (arteriosclerotic heart disease) 10/24/2011    Hyperlipidemia 05/11/2011    HTN (hypertension) 05/11/2011     Current Outpatient Medications   Medication Sig Dispense Refill    CPAP Machine MISC by Does not apply route      hydrochlorothiazide (HYDRODIURIL) 25 MG tablet TAKE 1 TABLET DAILY 90 tablet 3    furosemide (LASIX) 40 MG tablet TAKE 1 TABLET DAILY 90 tablet 3    atorvastatin (LIPITOR) 20 MG tablet TAKE 1 TABLET DAILY 90 tablet 3    LORazepam (ATIVAN) 0.5 MG tablet Take 1 tablet by mouth nightly as needed for Anxiety for up to 90 days.  90 tablet 0    Continuous Blood Gluc Sensor (FREESTYLE CANDACE SENSOR dicyclomine (BENTYL) 10 MG capsule Take 1 capsule by mouth 4 times daily as needed (abdominal pain) 60 capsule 0    Glucose Blood (BLOOD GLUCOSE TEST STRIPS) STRP Joseluis Contour glucose test strips-uses 4x per day or prn with sliding scale coverage   Dx:250.01 Type 1 Diabetes 400 strip 3    ammonium lactate (LAC-HYDRIN) 12 % lotion Apply to nails, trunk, arms, legs  g 6    ciclopirox (LOPROX) 0.77 % cream Apply to legs, buttocks BID PRN flares 90 g 2    JOSELUIS MICROLET LANCETS MISC Tests 4 to 5 times daily or prn 300 each 3    Polyethylene Glycol 3350 (MIRALAX PO) Take 1 capsule by mouth as needed       B Complex-Biotin-FA (VITAMIN B50 COMPLEX PO) Take 1 tablet by mouth daily.  Vitamin D (CHOLECALCIFEROL) 1000 UNITS CAPS capsule Take 1,000 Units by mouth daily.  latanoprost (XALATAN) 0.005 % ophthalmic solution Place 1 drop into both eyes daily.       docusate sodium (COLACE) 100 MG capsule Take 100 mg by mouth 2 times daily       nitroGLYCERIN (NITROSTAT) 0.4 MG SL tablet Place 1 tablet under the tongue every 5 minutes as needed for Chest pain 50 tablet 1     Current Facility-Administered Medications   Medication Dose Route Frequency Provider Last Rate Last Dose    cyanocobalamin injection 1,000 mcg  1,000 mcg Intramuscular Once Radha Tipton MD         Allergies: Shellfish-derived products and Iodides  Past Medical History:   Diagnosis Date    Anesthesia     \"Violent Vomiting After Anesthetic\"    ASHD (arteriosclerotic heart disease) 3/95    Sees Dr. Marisela Caldwell Once A Year    CAD (coronary artery disease) 1990    PTCA to LAD    Diabetes mellitus ([facility] [address] Dx 1985    Full dentures     H/O cardiovascular stress test 9/21/12 9/12-EF 70% WNL     H/O cardiovascular stress test 12/15/2014    cardiolite-normal, no ischemia, EF68%    H/O Doppler ultrasound 3/9/11    PERIPHERAL ARTERIAL 3/11-Essentially normal.     H/O Doppler ultrasound 5/18/10    CARTOID: 5/10-Mild to moderate Component Value Date    INR 0.97 05/04/2016     Lab Results   Component Value Date    LABA1C 8.0 (H) 08/09/2019    LABA1C 8.6 (H) 06/04/2019     Lab Results   Component Value Date    WBC 7.8 08/09/2019    HCT 42.2 08/09/2019    MCV 93 08/09/2019     08/09/2019     Lab Results   Component Value Date    CHOL 152 08/09/2019    TRIG 120 08/09/2019    HDL 65 08/09/2019    LDLCALC 63 08/09/2019     Lab Results   Component Value Date    ALT 10 08/09/2019    AST 18 08/09/2019     BMP:    Lab Results   Component Value Date     08/09/2019    K 3.8 08/09/2019    CL 92 08/09/2019    CO2 28 08/09/2019    BUN 23 08/09/2019    CREATININE 1.77 08/09/2019     CMP:   Lab Results   Component Value Date     08/09/2019    K 3.8 08/09/2019    CL 92 08/09/2019    CO2 28 08/09/2019    BUN 23 08/09/2019    PROT 6.0 08/09/2019    PROT 6.3 01/22/2013     TSH:    Lab Results   Component Value Date    TSH 4.500 06/04/2019    TSHHS 2.118 12/21/2010           Impression:    No diagnosis found. Patient Active Problem List   Diagnosis Code    Hyperlipidemia E78.5    HTN (hypertension) I10    ASHD (arteriosclerotic heart disease) I25.10    Renal cancer ([facility] [address] C64.9    Type 2 diabetes mellitus ([facility] [address] E11.9    CRF (chronic renal failure) N18.9    Appendicitis K37    CAD (coronary artery disease) I25.10    Controlled substance agreement signed Z79.899    Diabetic peripheral neuropathy (HCC) E11.42    Cancer of ureter ([facility] [address] C66.9    Chronic peripheral venous hypertension I87.309    Fracture of four ribs S22.49XA    Morbid obesity ([facility] [address] E66.01    Right upper quadrant abdominal pain R10.11    Diaphoresis R61    Chronic pain G89.29       Assessment & Plan:               -     CORONARY ARTERY DISEASE:  asymptomatic     All available  tests in chart reviewed. Management discussed .   Testing ordered  no                                 -  Hypertension: Patients blood pressure is normal. Patient is advised about low sodium

## 2019-09-27 DIAGNOSIS — M48.061 SPINAL STENOSIS OF LUMBAR REGION WITHOUT NEUROGENIC CLAUDICATION: ICD-10-CM

## 2019-09-27 DIAGNOSIS — F41.1 GENERALIZED ANXIETY DISORDER: ICD-10-CM

## 2019-09-27 NOTE — TELEPHONE ENCOUNTER
Ativan was not taken to pharmacy and filled; it is past the 2 week deadline. Patient needs Ativan and Percocet refilled.

## 2019-09-30 RX ORDER — OXYCODONE HYDROCHLORIDE AND ACETAMINOPHEN 5; 325 MG/1; MG/1
1 TABLET ORAL EVERY 8 HOURS PRN
Qty: 120 TABLET | Refills: 0 | Status: SHIPPED | OUTPATIENT
Start: 2019-09-30 | End: 2019-11-07 | Stop reason: SDUPTHER

## 2019-09-30 RX ORDER — LORAZEPAM 0.5 MG/1
0.5 TABLET ORAL NIGHTLY PRN
Qty: 90 TABLET | Refills: 0 | Status: SHIPPED | OUTPATIENT
Start: 2019-09-30 | End: 2019-12-16 | Stop reason: SDUPTHER

## 2019-10-21 ENCOUNTER — OFFICE VISIT (OUTPATIENT)
Dept: INTERNAL MEDICINE CLINIC | Age: 74
End: 2019-10-21
Payer: MEDICARE

## 2019-10-21 VITALS
WEIGHT: 233.4 LBS | BODY MASS INDEX: 40.06 KG/M2 | SYSTOLIC BLOOD PRESSURE: 130 MMHG | DIASTOLIC BLOOD PRESSURE: 70 MMHG | HEART RATE: 72 BPM

## 2019-10-21 DIAGNOSIS — Z79.4 TYPE 2 DIABETES MELLITUS WITH STAGE 3 CHRONIC KIDNEY DISEASE, WITH LONG-TERM CURRENT USE OF INSULIN (HCC): Primary | ICD-10-CM

## 2019-10-21 DIAGNOSIS — E11.22 TYPE 2 DIABETES MELLITUS WITH STAGE 3 CHRONIC KIDNEY DISEASE, WITH LONG-TERM CURRENT USE OF INSULIN (HCC): Primary | ICD-10-CM

## 2019-10-21 DIAGNOSIS — Z79.4 TYPE 2 DIABETES MELLITUS WITH DIABETIC NEUROPATHIC ARTHROPATHY, WITH LONG-TERM CURRENT USE OF INSULIN (HCC): ICD-10-CM

## 2019-10-21 DIAGNOSIS — E79.0 HYPERURICEMIA: ICD-10-CM

## 2019-10-21 DIAGNOSIS — Z23 NEED FOR IMMUNIZATION AGAINST INFLUENZA: ICD-10-CM

## 2019-10-21 DIAGNOSIS — I10 ESSENTIAL HYPERTENSION: ICD-10-CM

## 2019-10-21 DIAGNOSIS — M48.061 SPINAL STENOSIS OF LUMBAR REGION WITHOUT NEUROGENIC CLAUDICATION: ICD-10-CM

## 2019-10-21 DIAGNOSIS — I87.2 VENOUS INSUFFICIENCY OF BOTH LOWER EXTREMITIES: ICD-10-CM

## 2019-10-21 DIAGNOSIS — E11.610 TYPE 2 DIABETES MELLITUS WITH DIABETIC NEUROPATHIC ARTHROPATHY, WITH LONG-TERM CURRENT USE OF INSULIN (HCC): ICD-10-CM

## 2019-10-21 DIAGNOSIS — E11.65 TYPE 2 DIABETES MELLITUS WITH HYPERGLYCEMIA, WITH LONG-TERM CURRENT USE OF INSULIN (HCC): Primary | ICD-10-CM

## 2019-10-21 DIAGNOSIS — E78.2 MIXED HYPERLIPIDEMIA: ICD-10-CM

## 2019-10-21 DIAGNOSIS — Z79.4 TYPE 2 DIABETES MELLITUS WITH HYPERGLYCEMIA, WITH LONG-TERM CURRENT USE OF INSULIN (HCC): Primary | ICD-10-CM

## 2019-10-21 DIAGNOSIS — E03.4 HYPOTHYROIDISM DUE TO ACQUIRED ATROPHY OF THYROID: ICD-10-CM

## 2019-10-21 DIAGNOSIS — N18.30 TYPE 2 DIABETES MELLITUS WITH STAGE 3 CHRONIC KIDNEY DISEASE, WITH LONG-TERM CURRENT USE OF INSULIN (HCC): Primary | ICD-10-CM

## 2019-10-21 DIAGNOSIS — N18.30 CHRONIC RENAL FAILURE, STAGE 3 (MODERATE) (HCC): ICD-10-CM

## 2019-10-21 PROCEDURE — G0008 ADMIN INFLUENZA VIRUS VAC: HCPCS | Performed by: INTERNAL MEDICINE

## 2019-10-21 PROCEDURE — 90653 IIV ADJUVANT VACCINE IM: CPT | Performed by: INTERNAL MEDICINE

## 2019-10-21 PROCEDURE — 99214 OFFICE O/P EST MOD 30 MIN: CPT | Performed by: INTERNAL MEDICINE

## 2019-10-21 RX ORDER — FLASH GLUCOSE SENSOR
KIT MISCELLANEOUS
Qty: 4 EACH | Refills: 3 | Status: SHIPPED | OUTPATIENT
Start: 2019-10-21 | End: 2020-11-12

## 2019-10-21 RX ORDER — FLASH GLUCOSE SENSOR
KIT MISCELLANEOUS
Qty: 1 DEVICE | Refills: 0 | Status: SHIPPED | OUTPATIENT
Start: 2019-10-21 | End: 2020-11-12 | Stop reason: SDUPTHER

## 2019-11-06 DIAGNOSIS — M48.061 SPINAL STENOSIS OF LUMBAR REGION WITHOUT NEUROGENIC CLAUDICATION: ICD-10-CM

## 2019-11-07 RX ORDER — OXYCODONE HYDROCHLORIDE AND ACETAMINOPHEN 5; 325 MG/1; MG/1
1 TABLET ORAL EVERY 8 HOURS PRN
Qty: 120 TABLET | Refills: 0 | Status: SHIPPED | OUTPATIENT
Start: 2019-11-07 | End: 2019-12-16 | Stop reason: SDUPTHER

## 2019-12-16 DIAGNOSIS — F41.1 GENERALIZED ANXIETY DISORDER: ICD-10-CM

## 2019-12-16 DIAGNOSIS — M48.061 SPINAL STENOSIS OF LUMBAR REGION WITHOUT NEUROGENIC CLAUDICATION: ICD-10-CM

## 2019-12-16 RX ORDER — OXYCODONE HYDROCHLORIDE AND ACETAMINOPHEN 5; 325 MG/1; MG/1
1 TABLET ORAL EVERY 8 HOURS PRN
Qty: 120 TABLET | Refills: 0 | Status: SHIPPED | OUTPATIENT
Start: 2019-12-16 | End: 2020-01-27 | Stop reason: SDUPTHER

## 2019-12-16 RX ORDER — LORAZEPAM 0.5 MG/1
0.5 TABLET ORAL NIGHTLY PRN
Qty: 90 TABLET | Refills: 0 | Status: SHIPPED | OUTPATIENT
Start: 2019-12-16 | End: 2020-03-15

## 2020-01-06 RX ORDER — PEN NEEDLE, DIABETIC 29 G X1/2"
NEEDLE, DISPOSABLE MISCELLANEOUS
Qty: 180 EACH | Refills: 4 | Status: SHIPPED | OUTPATIENT
Start: 2020-01-06 | End: 2020-02-20 | Stop reason: SDUPTHER

## 2020-01-27 ENCOUNTER — OFFICE VISIT (OUTPATIENT)
Dept: INTERNAL MEDICINE CLINIC | Age: 75
End: 2020-01-27
Payer: MEDICARE

## 2020-01-27 VITALS
SYSTOLIC BLOOD PRESSURE: 120 MMHG | OXYGEN SATURATION: 91 % | HEART RATE: 66 BPM | DIASTOLIC BLOOD PRESSURE: 68 MMHG | WEIGHT: 230.2 LBS | BODY MASS INDEX: 39.51 KG/M2

## 2020-01-27 PROBLEM — F41.1 GENERALIZED ANXIETY DISORDER: Status: ACTIVE | Noted: 2020-01-27

## 2020-01-27 PROBLEM — R10.11 RIGHT UPPER QUADRANT ABDOMINAL PAIN: Status: RESOLVED | Noted: 2017-06-06 | Resolved: 2020-01-27

## 2020-01-27 PROBLEM — E79.0 HYPERURICEMIA: Status: ACTIVE | Noted: 2020-01-27

## 2020-01-27 PROBLEM — R25.1 TREMOR OF BOTH HANDS: Status: ACTIVE | Noted: 2020-01-27

## 2020-01-27 PROCEDURE — 99204 OFFICE O/P NEW MOD 45 MIN: CPT | Performed by: FAMILY MEDICINE

## 2020-01-27 RX ORDER — OXYCODONE HYDROCHLORIDE AND ACETAMINOPHEN 5; 325 MG/1; MG/1
1 TABLET ORAL EVERY 8 HOURS PRN
Qty: 120 TABLET | Refills: 0 | Status: SHIPPED | OUTPATIENT
Start: 2020-01-27 | End: 2020-02-25

## 2020-01-27 RX ORDER — DORZOLAMIDE HYDROCHLORIDE AND TIMOLOL MALEATE 20; 5 MG/ML; MG/ML
1 SOLUTION/ DROPS OPHTHALMIC 2 TIMES DAILY
COMMUNITY

## 2020-01-27 ASSESSMENT — PATIENT HEALTH QUESTIONNAIRE - PHQ9
2. FEELING DOWN, DEPRESSED OR HOPELESS: 0
1. LITTLE INTEREST OR PLEASURE IN DOING THINGS: 0
SUM OF ALL RESPONSES TO PHQ QUESTIONS 1-9: 0
SUM OF ALL RESPONSES TO PHQ QUESTIONS 1-9: 0
SUM OF ALL RESPONSES TO PHQ9 QUESTIONS 1 & 2: 0

## 2020-01-27 NOTE — PROGRESS NOTES
Pupils: Pupils are equal, round, and reactive to light. Neck:      Musculoskeletal: Normal range of motion and neck supple. Thyroid: No thyromegaly. Cardiovascular:      Rate and Rhythm: Normal rate and regular rhythm. Heart sounds: Normal heart sounds. Pulmonary:      Effort: Pulmonary effort is normal.      Breath sounds: Normal breath sounds. Abdominal:      General: Bowel sounds are normal. There is no distension. Palpations: Abdomen is soft. Tenderness: There is no abdominal tenderness. There is no guarding. Musculoskeletal:      Right lower leg: No edema. Left lower leg: No edema. Comments: 5-5 muscular strength throughout and bilateral.  Bilateral lumbosacral muscular tenderness with reduced range of motion  Leg length discrepancy   Skin:     General: Skin is warm and dry. Neurological:      General: No focal deficit present. Mental Status: He is alert and oriented to person, place, and time. Psychiatric:         Mood and Affect: Mood normal.         Behavior: Behavior normal.         Thought Content: Thought content normal.         Judgment: Judgment normal.            Assessment / Plan:      1. Spinal stenosis of lumbar region without neurogenic claudication  - Patient agreed for OMT treatment for h his back pain that will start at next visit. - oxyCODONE-acetaminophen (PERCOCET) 5-325 MG per tablet; Take 1 tablet by mouth every 8 hours as needed for Pain for up to 30 days. May go to 1 every 6 hrs as needed for extreme pain. Dispense: 120 tablet; Refill: 0    Discontinuation plan: Patient will gradually taper off her medication lorazepam over the time periodf 4 to 6 weeks. First week he will skip 1 day between the medication, following week he will skip 2 days between the medication and so on. Detailed and extensive discussion was made with the patient.   We will continue patient on his Norco until he is completely discontinued lorazepam.  Patient also taking gabapentin. 2. Type 2 diabetes mellitus with diabetic peripheral angiopathy without gangrene, with long-term current use of insulin ([address]  - Patient last hemoglobin A1c was 9.1.  - Patient is currently taking sliding scale of Humalog and 35 units of Humulin. 3. Generalized anxiety disorder  -Stable on current home medications. Patient currently taking duloxetine and sertraline. 4. Sleep disturbance  - Patient currently taking lorazepam at night for sleep. Patient agreed to discontinuation plan mentioned above. 5. Tremor of both hands  - Patient is currently taking carbidopa levodopa for his restless legs and hands. 6. Hyperuricemia  - Patient currently taking allopurinol 100 mg once daily. No recent uric acid level. Patient last uric acid level was 7.6. We will increase patient allopurinol at next visit. 7. Essential hypertension  - We will discontinue patient medication hydrochlorothiazide at next visit. Patient currently taking  Lasix as well as hydrochlorothiazide. Hydrochlorothiazide can cause hyperuricemia. 8. Mixed hyperlipidemia  - Patient currently taking atorvastatin 20 mg once daily. Patient last LDL was 74 and HDL was 64.    9. Encounter to establish care with new doctor  - Stable and fair health. Note:   Patient understand the assessment and agreed to the plan. Medication side effects was discussed during the encounter. Before discharging the patient, all questions and concern were addressed. After visit summary was provided. Advice to call clinic or me for any further questions or concern.        Jose Selby,

## 2020-01-27 NOTE — LETTER
MEDICATION AGREEMENT     Sriram Angry  [birth year]      For certain conditions, multiple classes of medications may be used to help better manage your symptoms, and to improve your ability to function at home, work and in social settings. However, these medications do have risks, which will be discussed with you, including addiction and dependency. The following prescribed medications need frequent monitoring and will require you to partner and assist in your healthcare. Medication  Dose, instructions and quantity as indicated on current prescription bottle Diagnosis/Reason(s) for Taking Category   Take 1 tablet by mouth every 8 hours as needed for pain up to 30 days. May go to 1 every 6 hours as needed for extreme pain. 120 tablets                            Benefits and goals of Controlled Substance Medications: There are two potential goals for your treatment: (1) decreased pain and suffering (2) improved daily life functions. There are many possible treatments for your chronic condition(s), and, in addition to controlled substance medications, we will try alternatives such as physical therapy, yoga, massage, home daily exercise, meditation, relaxation techniques, injections, chiropractic manipulations, surgery, cognitive therapy, hypnosis and many medications that are not habit-forming. Use of controlled substance medications may be helpful, but they are unlikely to resolve all of your symptoms or restore all function. Risks of Controlled Substance Medications:    Opioid pain medications: These medications can lead to problems such as addiction/dependence, sedation, lightheadedness/dizziness, memory issues, falls, constipation, nausea, or vomiting. They may also impair the ability to drive or operate machinery. Additionally, these medications may lower testosterone levels, leading to loss of bone strength, stamina and sex drive.   They may cause problems with breathing, sleep apnea and

## 2020-01-28 ASSESSMENT — ENCOUNTER SYMPTOMS
COUGH: 0
BACK PAIN: 1
EYE ITCHING: 0
NAUSEA: 0
DIARRHEA: 0
ABDOMINAL PAIN: 0
TROUBLE SWALLOWING: 0
WHEEZING: 0
ABDOMINAL DISTENTION: 0
VOMITING: 0
SORE THROAT: 0
CHEST TIGHTNESS: 0
SHORTNESS OF BREATH: 0
SINUS PAIN: 0
EYE REDNESS: 0
CONSTIPATION: 0

## 2020-02-25 ENCOUNTER — OFFICE VISIT (OUTPATIENT)
Dept: INTERNAL MEDICINE CLINIC | Age: 75
End: 2020-02-25
Payer: MEDICARE

## 2020-02-25 VITALS
OXYGEN SATURATION: 90 % | WEIGHT: 230.6 LBS | HEART RATE: 92 BPM | BODY MASS INDEX: 39.58 KG/M2 | DIASTOLIC BLOOD PRESSURE: 68 MMHG | SYSTOLIC BLOOD PRESSURE: 140 MMHG

## 2020-02-25 PROCEDURE — 98926 OSTEOPATH MANJ 3-4 REGIONS: CPT | Performed by: FAMILY MEDICINE

## 2020-02-25 PROCEDURE — 99214 OFFICE O/P EST MOD 30 MIN: CPT | Performed by: FAMILY MEDICINE

## 2020-02-25 RX ORDER — OXYCODONE HYDROCHLORIDE AND ACETAMINOPHEN 5; 325 MG/1; MG/1
1 TABLET ORAL 3 TIMES DAILY PRN
Qty: 90 TABLET | Refills: 0 | Status: SHIPPED | OUTPATIENT
Start: 2020-02-25 | End: 2020-03-28 | Stop reason: SDUPTHER
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
## 2020-02-25 NOTE — LETTER
and sex drive. They may cause problems with breathing, sleep apnea and reduced coughing, which are especially dangerous for patients with lung disease. Overdose or dangerous interactions with alcohol and other medications may occur, leading to death. Hyperalgesia may develop, in which patients receiving opioids for the treatment of pain may actually become more sensitive to certain painful stimuli, and in some cases, experience pain from ordinarily non-painful stimuli. Women between the ages of 14-53 who could become pregnant should carefully weigh the risks and benefits of opioids with their physicians, as these medications increase the risk of pregnancy complications, including miscarriage,  delivery and stillbirth. It is also possible for babies to be born addicted to opioids. Opioid dependence withdrawal symptoms may include; feelings of uneasiness, increased pain, irritability, belly pain, diarrhea, sweats and goose-flesh. Benzodiazepines and non-benzodiazepine sleep medications: These medications can lead to problems such as addiction/dependence, sedation, fatigue, lightheadedness, dizziness, incoordination, falls, depression, hallucinations, and impaired judgment, memory and concentration. The ability to drive and operate machinery may also be affected. Abnormal sleep-related behaviors have been reported, including sleep walking, driving, making telephone calls, eating, or having sex while not fully awake. These medications can suppress breathing and worsen sleep apnea, particularly when combined with alcohol or other sedating medications, potentially leading to death. Dependence withdrawal symptoms may include tremors, anxiety, hallucinations and seizures. Stimulants:  Common adverse effects include addiction/dependence, increased blood pressure and heart rate, decreased appetite, nausea, involuntary weight loss, insomnia, irritability, and headaches.   These risks may increase when these medications are combined with other stimulants, such as caffeine pills or energy drinks, certain weight loss supplements and oral decongestants. Dependence withdrawal symptoms may include depressed mood, loss of interest, suicidal thoughts, anxiety, fatigue, appetite changes and agitation. Testosterone replacement therapy:  Potential side effects include increased risk of stroke and heart attack, blood clots, increased blood pressure, increased cholesterol, enlarged prostate, sleep apnea, irritability/aggression and other mood disorders, and decreased fertility. Other:     1. I understand that I have the following responsibilities:  · I will take medications at the dose and frequency prescribed. · I will not increase or change how I take my medications without the approval of the health care provider who signs this Medication Agreement. · I will arrange for refills at the prescribed interval ONLY during regular office hours. I will not ask for refills earlier than agreed, after-hours, on holidays or on weekends. · I will obtain all refills for these medications at  ·  ____________________________________  pharmacy (phone number  ·  ________________________), with full consent for my provider and pharmacist to exchange information in writing or verbally. · I will not request any pain medications or controlled substances from other providers and will inform this provider of all other medications I am taking. · I will inform my other health care providers that I am taking these medications and of the existence of this [address]. In the event of an emergency, I will provide the same information to the emergency department providers. · I will protect my prescriptions and medications. I understand that lost or misplaced prescriptions will not be replaced.   · I will keep medications only for my own use and will not share them with others. I will keep all medications away from children. · I agree to participate in any medical, psychological or psychiatric assessments recommended by my provider. · I will actively participate in any program designed to improve function, including social, physical, psychological and daily or work activities. 2. I will not use illegal or street drugs or another person's prescription. If I have an addiction problem with drugs or alcohol and my provider asks me to enter a program to address this issue, I agree to follow through. Such programs may include:  · 12-Step program and securing a sponsor  · Individual counseling   · Inpatient or outpatient treatment  · Other:_____________________________________________________________________________________________________________________________________________    If in treatment, I will request that a copy of the programs initial evaluation and treatment recommendations be sent to this provider and will not expect refills until that is received. I will also request written monthly updates be sent to this provider to verify my continuing treatment. 3. I will consent to drug screening upon my providers request to assure I am only taking the prescribed drugs, described in this MEDICATION AGREEMENT. I understand that a drug screen is a laboratory test in which a sample of my urine, blood or saliva is checked to see what drugs I have been taking. 4. I agree that I will treat the providers and staff at this office with respect at all times. I will keep all of my scheduled appointments, but if I need to cancel my appointment, I will do so a minimum of 24 hours before it is scheduled. 5. I understand that this provider may stop prescribing the medications listed if:  · I do not show any improvement in pain, or my activity has not improved. · I develop rapid tolerance or loss of improvement, as described in my treatment plan. · I develop significant side effects from the medication. · My behavior is inconsistent with the responsibilities outlined above, which may also result in my being prevented from receiving further care from this office. · Other:____________________________________________________________________    AGREEMENT:    I have read the above and have had all of my questions answered. For chronic disease management, I know that my symptoms can be managed with many types of treatments. A chronic medication trial may be part of my treatment, but I must be an active participant in my care. Medication therapy is only one part of my symptom management plan. In some cases, there may be limited scientific evidence to support the chronic use of certain medications to improve symptoms and daily function. Furthermore, in certain circumstances, there may be scientific information that suggests that use of chronic controlled substances may actually worsen my symptoms and increase my risk of unintentional death directly related to this medication therapy. I know that if my provider feels my risk from controlled medications is greater than my benefit, I will have my controlled substance medication(s) compassionately lowered or removed altogether. I agree to a controlled substance medication trial.      I further agree to allow this office to contact my HIPAA contact on file if there are concerns about my safety and use of controlled medications. I have agreed to use the following medications above as instructed by my physician and as stated in this [address].      Patient Signature:  ______________________  JTAS: or _____________    Provider Signature:______________________  JQQ or _____________

## 2020-02-26 RX ORDER — GABAPENTIN 300 MG/1
CAPSULE ORAL
Qty: 270 CAPSULE | Refills: 3 | Status: SHIPPED | OUTPATIENT
Start: 2020-02-26 | End: 2021-02-26

## 2020-02-29 PROBLEM — M99.05 SOMATIC DYSFUNCTION OF PELVIS REGION: Status: ACTIVE | Noted: 2020-02-29

## 2020-02-29 PROBLEM — M99.06 SOMATIC DYSFUNCTION OF BOTH LOWER EXTREMITIES: Status: ACTIVE | Noted: 2020-02-29

## 2020-02-29 PROBLEM — F51.04 CHRONIC INSOMNIA: Status: ACTIVE | Noted: 2020-02-29

## 2020-02-29 PROBLEM — M99.09 SOMATIC DYSFUNCTION OF BACK: Status: ACTIVE | Noted: 2020-02-29

## 2020-02-29 ASSESSMENT — ENCOUNTER SYMPTOMS
COUGH: 0
ABDOMINAL PAIN: 0
SHORTNESS OF BREATH: 0
CHEST TIGHTNESS: 0
EYE ITCHING: 0
ABDOMINAL DISTENTION: 0
SORE THROAT: 0
CONSTIPATION: 0
BACK PAIN: 0
DIARRHEA: 0
VOMITING: 0
EYE REDNESS: 0
TROUBLE SWALLOWING: 0
NAUSEA: 0
SINUS PAIN: 0
WHEEZING: 0

## 2020-03-03 ENCOUNTER — TELEPHONE (OUTPATIENT)
Dept: INTERNAL MEDICINE CLINIC | Age: 75
End: 2020-03-03

## 2020-03-03 RX ORDER — INSULIN LISPRO 100 [IU]/ML
INJECTION, SOLUTION INTRAVENOUS; SUBCUTANEOUS
Qty: 5 CARTRIDGE | Refills: 3 | Status: SHIPPED | OUTPATIENT
Start: 2020-03-03

## 2020-03-03 NOTE — TELEPHONE ENCOUNTER
Patient called and needed rx's phoned in. I left April Rincon a message to call me back and confirm what rx's needed sent in.

## 2020-03-28 RX ORDER — OXYCODONE HYDROCHLORIDE AND ACETAMINOPHEN 5; 325 MG/1; MG/1
1 TABLET ORAL 3 TIMES DAILY PRN
Qty: 90 TABLET | Refills: 0 | Status: SHIPPED | OUTPATIENT
Start: 2020-03-28 | End: 2020-03-28 | Stop reason: SDUPTHER

## 2020-03-28 RX ORDER — OXYCODONE HYDROCHLORIDE AND ACETAMINOPHEN 5; 325 MG/1; MG/1
1 TABLET ORAL 3 TIMES DAILY PRN
Qty: 90 TABLET | Refills: 0 | OUTPATIENT
Start: 2020-03-28 | End: 2020-04-27

## 2020-03-28 RX ORDER — OXYCODONE HYDROCHLORIDE AND ACETAMINOPHEN 5; 325 MG/1; MG/1
1 TABLET ORAL 3 TIMES DAILY PRN
Qty: 90 TABLET | Refills: 0 | Status: SHIPPED | OUTPATIENT
Start: 2020-03-28 | End: 2020-04-27 | Stop reason: SDUPTHER

## 2020-04-01 RX ORDER — TIZANIDINE HYDROCHLORIDE 2 MG/1
CAPSULE, GELATIN COATED ORAL
Qty: 180 CAPSULE | Refills: 3 | Status: SHIPPED | OUTPATIENT
Start: 2020-04-01

## 2020-04-27 ENCOUNTER — TELEPHONE (OUTPATIENT)
Dept: INTERNAL MEDICINE CLINIC | Age: 75
End: 2020-04-27

## 2020-04-27 RX ORDER — OXYCODONE HYDROCHLORIDE AND ACETAMINOPHEN 5; 325 MG/1; MG/1
1 TABLET ORAL 3 TIMES DAILY PRN
Qty: 90 TABLET | Refills: 0 | Status: SHIPPED | OUTPATIENT
Start: 2020-04-27 | End: 2020-05-27

## 2020-05-29 RX ORDER — OXYCODONE HYDROCHLORIDE AND ACETAMINOPHEN 5; 325 MG/1; MG/1
1 TABLET ORAL 3 TIMES DAILY PRN
Qty: 90 TABLET | Refills: 0 | Status: SHIPPED | OUTPATIENT
Start: 2020-05-29 | End: 2020-07-06 | Stop reason: SDUPTHER

## 2020-06-11 LAB — DIABETIC RETINOPATHY: NEGATIVE

## 2020-07-06 RX ORDER — OXYCODONE HYDROCHLORIDE AND ACETAMINOPHEN 5; 325 MG/1; MG/1
1 TABLET ORAL 3 TIMES DAILY PRN
Qty: 90 TABLET | Refills: 0 | Status: SHIPPED | OUTPATIENT
Start: 2020-07-06 | End: 2020-08-06 | Stop reason: SDUPTHER

## 2020-07-09 RX ORDER — ALLOPURINOL 100 MG/1
200 TABLET ORAL DAILY
Qty: 180 TABLET | Refills: 3 | Status: SHIPPED | OUTPATIENT
Start: 2020-07-09

## 2020-07-13 ENCOUNTER — OFFICE VISIT (OUTPATIENT)
Dept: INTERNAL MEDICINE CLINIC | Age: 75
End: 2020-07-13
Payer: MEDICARE

## 2020-07-13 ENCOUNTER — TELEPHONE (OUTPATIENT)
Dept: INTERNAL MEDICINE CLINIC | Age: 75
End: 2020-07-13

## 2020-07-13 VITALS — TEMPERATURE: 98.8 F | OXYGEN SATURATION: 98 % | WEIGHT: 234.8 LBS | HEART RATE: 67 BPM | BODY MASS INDEX: 40.3 KG/M2

## 2020-07-13 PROBLEM — K21.9 GASTROESOPHAGEAL REFLUX DISEASE WITHOUT ESOPHAGITIS: Status: ACTIVE | Noted: 2020-07-13

## 2020-07-13 PROBLEM — M48.061 SPINAL STENOSIS OF LUMBAR REGION WITHOUT NEUROGENIC CLAUDICATION: Status: ACTIVE | Noted: 2020-07-13

## 2020-07-13 PROBLEM — R60.0 BILATERAL LOWER EXTREMITY EDEMA: Status: ACTIVE | Noted: 2020-07-13

## 2020-07-13 LAB — HBA1C MFR BLD: 7.5 %

## 2020-07-13 PROCEDURE — 83036 HEMOGLOBIN GLYCOSYLATED A1C: CPT | Performed by: FAMILY MEDICINE

## 2020-07-13 PROCEDURE — 99214 OFFICE O/P EST MOD 30 MIN: CPT | Performed by: FAMILY MEDICINE

## 2020-07-13 PROCEDURE — 3051F HG A1C>EQUAL 7.0%<8.0%: CPT | Performed by: FAMILY MEDICINE

## 2020-07-13 ASSESSMENT — ENCOUNTER SYMPTOMS
COUGH: 0
CONSTIPATION: 0
WHEEZING: 0
SINUS PAIN: 0
DIARRHEA: 0
SHORTNESS OF BREATH: 0
NAUSEA: 0
TROUBLE SWALLOWING: 0
ABDOMINAL DISTENTION: 0
EYE REDNESS: 0
BACK PAIN: 1
SORE THROAT: 0
CHEST TIGHTNESS: 0
VOMITING: 0
EYE ITCHING: 0
ABDOMINAL PAIN: 0
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
## 2020-07-17 ENCOUNTER — OFFICE VISIT (OUTPATIENT)
Dept: INTERNAL MEDICINE CLINIC | Age: 75
End: 2020-07-17
Payer: MEDICARE

## 2020-07-17 VITALS — TEMPERATURE: 97.3 F

## 2020-07-17 PROCEDURE — 99213 OFFICE O/P EST LOW 20 MIN: CPT | Performed by: FAMILY MEDICINE

## 2020-07-17 PROCEDURE — 98926 OSTEOPATH MANJ 3-4 REGIONS: CPT | Performed by: FAMILY MEDICINE

## 2020-07-17 NOTE — PROGRESS NOTES
ARTERIAL 3/11-Essentially normal.     H/O Doppler ultrasound 5/18/10    CARTOID: 5/10-Mild to moderate ateriosclerotic plaquing involving the carotid bifurcation, similar in appearance to the previous study with no evidenc of flow-limiting stenosis.  H/O echocardiogram 3/9/11    3/11-EF >55% WNL     H/O kidney removal 3/12    Right    Heart murmur     [place] (hard of hearing)     Wears Bilateral Hearing Aids    Hx of echocardiogram 2017    EF 55-60 mod LV hypertrophy, Grade 1 diastolic dysfunction, mild aortic stenosis    Hyperlipidemia     Hypertension     Insomnia     Mitral regurgitation     Nausea & vomiting     \"Violent Vomiting After Anesthetic\"    Proteinuria     Restless leg syndrome     Right Kidney Cancer Dx 2-12    Staph infection     Post Op Back Surgery    Venous stasis ulcer of right lower extremity ([address] 2015    [facility] [address]        Social History     Tobacco Use    Smoking status: Former Smoker     Packs/day: 1.00     Years: 15.00     Pack years: 15.00     Last attempt to quit: 3/15/2000     Years since quittin.3    Smokeless tobacco: Never Used    Tobacco comment: Reviewed 2015   Substance Use Topics    Alcohol use: No        Family History   Problem Relation Age of Onset    Early Death Father 59    Heart Disease Father     High Blood Pressure Father        Review of Systems   Constitutional: Positive for fatigue. Negative for appetite change, chills, fever and unexpected weight change. HENT: Negative for congestion, ear pain, sinus pain, sore throat and trouble swallowing. Eyes: Negative for redness and itching. Respiratory: Negative for cough, chest tightness, shortness of breath and wheezing. Cardiovascular: Positive for leg swelling. Negative for chest pain and palpitations. Gastrointestinal: Negative for abdominal distention, abdominal pain, constipation, diarrhea, nausea and vomiting. Endocrine: Negative for polyuria. Status: He is alert and oriented to person, place, and time. Sensory: No sensory deficit. Motor: No weakness. Psychiatric:         Mood and Affect: Mood normal.         Behavior: Behavior normal.         Thought Content: Thought content normal.         Judgment: Judgment normal.       Somatic Findings:   Lumbosacral tenderness  Paraspinal lumbar tenderness  Decreased translation of pelvis  Pelvic diaphragm restriction  Tight popliteal fossa  Bilateral Leg Swelling and tightness  Leg length discrepancy  TART changes at L1-L2         Assessment / Plan:      1. Bilateral lower extremity edema  - Post OMT, patient has mild reduction in bilateral lower extremity edema. Patient wife has been trained to perform at least daily lower extremity lymphatic pumps. I will be reviewing the progress and performing bimonthly review and if necessary OMT on patient. - Patient also requested to reduce weight through low carbohydrate diet or keto diet. - [place] OSTEOPATHIC MANIP,3-4 BODY REGN    Procedure Note:  The patient verbally consented to the application of OMT. Patient was positioned appropriately for the techniques and repositioned as needed. Appropriate hand positioning and monitoring technique was performed. Reassessment of the effectiveness of the techniques was performed. 2. Somatic dysfunction of back  - Myofacial techniques applied to tense para-spinal muscles followed by muscle energy to the targeted muscle. - Re-assessment: Post-treatment para-spinal muscle are less tense with increased ROM of lumber spine.  - Need f/u OMT. Drink plenty of fluid and home stretching exercises. - [place] OSTEOPATHIC MANIP,3-4 BODY REGN    3. Somatic dysfunction of pelvis region   - Respiratory assisted muscle energy was applied to the lumbosacral region. Improvement in nutation and counternutation was noted afterward.   - BLT to pelvis resulted in more translation in horizontal plane.   -Reassessment: Improved flow and decreased dependent lower extremity edema  - May need f/u OMT. - [place] OSTEOPATHIC MANIP,3-4 BODY REGN    4. Somatic dysfunction of both lower extremities  - Pedal Pump is provided to bilateral leg to reduce swelling, pain and feeling of tightness. - Myofascial to both leg to loosen up tight extremity fascia   - Remove facial restriction at both popliteal fossa and at the femoral triangle. -Reassessment: Improved or decreased leg swelling and tightness. - May need f/u OMT. home stretching exercises. - [place] OSTEOPATHIC MANIP,3-4 BODY REGN          Note:   Patient understand the assessment and agreed to the plan. Medication side effects was discussed during the encounter. Before discharging the patient, all questions and concern were addressed. After visit summary was provided. Advice to call clinic or me for any further questions or concern.        Sami Nicole Cabot, DO

## 2020-07-18 ASSESSMENT — ENCOUNTER SYMPTOMS
VOMITING: 0
SORE THROAT: 0
ABDOMINAL DISTENTION: 0
EYE ITCHING: 0
NAUSEA: 0
CHEST TIGHTNESS: 0
ABDOMINAL PAIN: 0
CONSTIPATION: 0
TROUBLE SWALLOWING: 0
COUGH: 0
SINUS PAIN: 0
WHEEZING: 0
BACK PAIN: 1
SHORTNESS OF BREATH: 0
EYE REDNESS: 0
DIARRHEA: 0

## 2020-08-06 ENCOUNTER — OFFICE VISIT (OUTPATIENT)
Dept: INTERNAL MEDICINE CLINIC | Age: 75
End: 2020-08-06
Payer: MEDICARE

## 2020-08-06 VITALS
TEMPERATURE: 97.4 F | DIASTOLIC BLOOD PRESSURE: 72 MMHG | OXYGEN SATURATION: 97 % | HEART RATE: 88 BPM | SYSTOLIC BLOOD PRESSURE: 134 MMHG

## 2020-08-06 PROCEDURE — 99213 OFFICE O/P EST LOW 20 MIN: CPT | Performed by: FAMILY MEDICINE

## 2020-08-06 PROCEDURE — 98926 OSTEOPATH MANJ 3-4 REGIONS: CPT | Performed by: FAMILY MEDICINE

## 2020-08-06 RX ORDER — OXYCODONE HYDROCHLORIDE AND ACETAMINOPHEN 5; 325 MG/1; MG/1
1 TABLET ORAL 3 TIMES DAILY PRN
Qty: 90 TABLET | Refills: 0 | Status: SHIPPED | OUTPATIENT
Start: 2020-08-06 | End: 2020-09-04 | Stop reason: SDUPTHER

## 2020-08-06 ASSESSMENT — ENCOUNTER SYMPTOMS
EYE ITCHING: 0
COUGH: 0
ABDOMINAL PAIN: 0
WHEEZING: 0
SINUS PAIN: 0
CHEST TIGHTNESS: 0
EYE REDNESS: 0
BACK PAIN: 1
VOMITING: 0
ABDOMINAL DISTENTION: 0
DIARRHEA: 0
NAUSEA: 0
TROUBLE SWALLOWING: 0
SHORTNESS OF BREATH: 0
SORE THROAT: 0
CONSTIPATION: 0

## 2020-08-06 NOTE — PROGRESS NOTES
Chronic peripheral venous hypertension    Fracture of four ribs    Morbid obesity (HCC)    Diaphoresis    Chronic pain    Tremor of both hands    Hyperuricemia    Generalized anxiety disorder    Chronic insomnia    Somatic dysfunction of back    Somatic dysfunction of pelvis region    Somatic dysfunction of both lower extremities    Spinal stenosis of lumbar region without neurogenic claudication    Bilateral lower extremity edema    Gastroesophageal reflux disease without esophagitis       Past Medical History:   Diagnosis Date    Anesthesia     \"Violent Vomiting After Anesthetic\"    ASHD (arteriosclerotic heart disease) 3/95    Sees Dr. Deatrice Oppenheim Once A Year    CAD (coronary artery disease) 1990    PTCA to LAD    Diabetes mellitus ([facility] [address] Dx 1985    Full dentures     H/O cardiovascular stress test 9/21/12 9/12-EF 70% WNL     H/O cardiovascular stress test 12/15/2014    cardiolite-normal, no ischemia, EF68%    H/O Doppler ultrasound 3/9/11    PERIPHERAL ARTERIAL 3/11-Essentially normal.     H/O Doppler ultrasound 5/18/10    CARTOID: 5/10-Mild to moderate ateriosclerotic plaquing involving the carotid bifurcation, similar in appearance to the previous study with no evidenc of flow-limiting stenosis.      H/O echocardiogram 3/9/11    3/11-EF >55% WNL     H/O kidney removal 3/12    Right    Heart murmur     [place] (hard of hearing)     Wears Bilateral Hearing Aids    Hx of echocardiogram 06/16/2017    EF 55-60 mod LV hypertrophy, Grade 1 diastolic dysfunction, mild aortic stenosis    Hyperlipidemia     Hypertension     Insomnia     Mitral regurgitation     Nausea & vomiting     \"Violent Vomiting After Anesthetic\"    Proteinuria     Restless leg syndrome     Right Kidney Cancer Dx 2-12    Staph infection 2000's    Post Op Back Surgery    Venous stasis ulcer of right lower extremity ([facility] [address] 9/2015    [facility] [address]        Social History     Tobacco Use    Smoking status: Former Smoker     Packs/day: 1.00     Years: 15.00     Pack years: 15.00     Last attempt to quit: 3/15/2000     Years since quittin.4    Smokeless tobacco: Never Used    Tobacco comment: Reviewed 2015   Substance Use Topics    Alcohol use: No        Family History   Problem Relation Age of Onset    Early Death Father 59    Heart Disease Father     High Blood Pressure Father        Review of Systems   Constitutional: Negative for appetite change, chills, fatigue, fever and unexpected weight change. HENT: Negative for congestion, ear pain, sinus pain, sore throat and trouble swallowing. Eyes: Negative for redness and itching. Respiratory: Negative for cough, chest tightness, shortness of breath and wheezing. Cardiovascular: Positive for leg swelling. Negative for chest pain and palpitations. Gastrointestinal: Negative for abdominal distention, abdominal pain, constipation, diarrhea, nausea and vomiting. Endocrine: Negative for polyuria. Genitourinary: Negative for difficulty urinating, dysuria, frequency and urgency. Musculoskeletal: Positive for arthralgias, back pain and myalgias. Skin: Negative for rash. Neurological: Negative for dizziness and headaches. Psychiatric/Behavioral: Negative for behavioral problems, confusion and suicidal ideas. Objective:      /72   Pulse 88   Temp 97.4 °F (36.3 °C)   SpO2 97%      Physical Exam  Vitals signs reviewed. Constitutional:       Appearance: Normal appearance. He is well-developed. He is obese. HENT:      Head: Normocephalic and atraumatic. Right Ear: External ear normal.      Left Ear: External ear normal.      Mouth/Throat:      Mouth: Mucous membranes are moist.      Pharynx: Oropharynx is clear. Eyes:      Extraocular Movements: Extraocular movements intact. Conjunctiva/sclera: Conjunctivae normal.      Pupils: Pupils are equal, round, and reactive to light.    Neck:      Musculoskeletal: Normal range of motion and neck supple. Thyroid: No thyromegaly or thyroid tenderness. Vascular: No carotid bruit. Cardiovascular:      Rate and Rhythm: Normal rate and regular rhythm. Pulses: Normal pulses. Heart sounds: Normal heart sounds, S1 normal and S2 normal. No murmur. Pulmonary:      Effort: Pulmonary effort is normal.      Breath sounds: Normal breath sounds. Abdominal:      General: Bowel sounds are normal. There is no distension. Palpations: Abdomen is soft. Tenderness: There is no abdominal tenderness. There is no guarding. Hernia: No hernia is present. Comments: Soft, no hepatosplenomegaly   Musculoskeletal:      Right lower leg: Edema present. Left lower leg: Edema present. Comments: 5-5 muscular strength throughout and bilateral.  Bilateral lumbar sacral spinal tenderness with reduced range of motion. Lymphadenopathy:      Cervical: No cervical adenopathy. Skin:     General: Skin is warm and dry. Findings: No rash. Neurological:      General: No focal deficit present. Mental Status: He is alert and oriented to person, place, and time. Sensory: No sensory deficit. Motor: No weakness. Psychiatric:         Mood and Affect: Mood normal.         Behavior: Behavior normal.         Thought Content: Thought content normal.         Judgment: Judgment normal.       Somatic Findings:   Lumbosacral tenderness  Paraspinal lumbar tenderness  Decreased translation of pelvis  Pelvic diaphragm restriction  Tight popliteal fossa  Bilateral Leg Swelling and tightness  Leg length discrepancy  TART changes at L1-L2       Assessment / Plan:      1. Spinal stenosis of lumbar region without neurogenic claudication  - Post OMT, patient lower extremity edema has improved modestly with reduced back pain increased range of motion. Patient will be seen again in 3 weeks for reevaluation and OMT.   - Patient wife was encouraged to continue home is provided to bilateral leg to reduce swelling, pain and feeling of tightness. - Myofascial to both leg to loosen up tight extremity fascia   - Remove facial restriction at both popliteal fossa and at the femoral triangle.   -Reassessment: Improved or decreased leg swelling and tightness. - May need f/u OMT. home stretching exercises. - [place] OSTEOPATHIC MANIP,3-4 BODY REGN          Note:   Patient understand the assessment and agreed to the plan. Medication side effects was discussed during the encounter. Before discharging the patient, all questions and concern were addressed. After visit summary was provided. Advice to call clinic or me for any further questions or concern.        Jose Kendall, DO

## 2020-08-07 RX ORDER — DULOXETIN HYDROCHLORIDE 30 MG/1
CAPSULE, DELAYED RELEASE ORAL
Qty: 90 CAPSULE | Refills: 3 | Status: SHIPPED | OUTPATIENT
Start: 2020-08-07

## 2020-08-10 ENCOUNTER — TELEPHONE (OUTPATIENT)
Dept: INTERNAL MEDICINE CLINIC | Age: 75
End: 2020-08-10

## 2020-08-10 NOTE — TELEPHONE ENCOUNTER
Called and spoke with the patient wife. Sending Voltaren cream to be applied on inner thigh.   Also recommended stretching exercises these were abductor as of lower libra no

## 2020-08-27 ENCOUNTER — OFFICE VISIT (OUTPATIENT)
Dept: INTERNAL MEDICINE CLINIC | Age: 75
End: 2020-08-27
Payer: MEDICARE

## 2020-08-27 VITALS — DIASTOLIC BLOOD PRESSURE: 84 MMHG | TEMPERATURE: 97.3 F | SYSTOLIC BLOOD PRESSURE: 197 MMHG | HEART RATE: 89 BPM

## 2020-08-27 PROCEDURE — 99214 OFFICE O/P EST MOD 30 MIN: CPT | Performed by: FAMILY MEDICINE

## 2020-08-27 PROCEDURE — 3051F HG A1C>EQUAL 7.0%<8.0%: CPT | Performed by: FAMILY MEDICINE
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
## 2020-08-30 PROBLEM — S22.49XA: Status: RESOLVED | Noted: 2017-01-06 | Resolved: 2020-08-30

## 2020-08-30 ASSESSMENT — ENCOUNTER SYMPTOMS
SINUS PAIN: 0
NAUSEA: 0
SORE THROAT: 0
DIARRHEA: 0
SHORTNESS OF BREATH: 0
ABDOMINAL DISTENTION: 0
COUGH: 0
CONSTIPATION: 0
VOMITING: 0
EYE ITCHING: 0
WHEEZING: 0
BACK PAIN: 1
EYE REDNESS: 0
ABDOMINAL PAIN: 0
TROUBLE SWALLOWING: 0
CHEST TIGHTNESS: 0

## 2020-09-04 RX ORDER — OXYCODONE HYDROCHLORIDE AND ACETAMINOPHEN 5; 325 MG/1; MG/1
1 TABLET ORAL 3 TIMES DAILY PRN
Qty: 90 TABLET | Refills: 0 | Status: SHIPPED | OUTPATIENT
Start: 2020-09-04 | End: 2020-10-04

## 2020-09-11 ENCOUNTER — TELEPHONE (OUTPATIENT)
Dept: INTERNAL MEDICINE CLINIC | Age: 75
End: 2020-09-11

## 2020-09-15 ENCOUNTER — OFFICE VISIT (OUTPATIENT)
Dept: CARDIOLOGY CLINIC | Age: 75
End: 2020-09-15
Payer: MEDICARE

## 2020-09-15 VITALS
SYSTOLIC BLOOD PRESSURE: 128 MMHG | HEART RATE: 64 BPM | HEIGHT: 64 IN | BODY MASS INDEX: 40.12 KG/M2 | WEIGHT: 235 LBS | DIASTOLIC BLOOD PRESSURE: 74 MMHG

## 2020-09-15 PROCEDURE — 99214 OFFICE O/P EST MOD 30 MIN: CPT | Performed by: INTERNAL MEDICINE

## 2020-09-15 RX ORDER — NITROGLYCERIN 0.4 MG/1
0.4 TABLET SUBLINGUAL EVERY 5 MIN PRN
Qty: 50 TABLET | Refills: 1 | Status: SHIPPED | OUTPATIENT
Start: 2020-09-15 | End: 2021-09-15

## 2020-09-15 NOTE — PROGRESS NOTES
CARDIOLOGY NOTE      9/15/2020    RE: Sofya De Souza  ([id number])                               TO:  Dr. Raj Pierre DO Bruce Olivares is a 67 y.o. male who was seen today for management of  cad                                    HPI:                   The patient does  have cardiac complaints of recurrent moderate SOB and fatigue and nikkie LE swelling  Patient also seen  for    - Coronary artery disease, does not have chest pain. Patient is  compliant with prescribed medicines. - Hypertension,is  well controlled, pt is  compliant with medicines  - Hyperlipidimea, importance of hyperlipidimea discussed with pt.   - Diabetes mellitus, blood glucose level is  well controlled.  Pt is compliant with meds and diet        Sofya De Souza has the following history recorded in care path:  Patient Active Problem List    Diagnosis Date Noted    Spinal stenosis of lumbar region without neurogenic claudication 07/13/2020    Bilateral lower extremity edema 07/13/2020    Gastroesophageal reflux disease without esophagitis 07/13/2020    Chronic insomnia 02/29/2020    Somatic dysfunction of back 02/29/2020    Somatic dysfunction of pelvis region 02/29/2020    Somatic dysfunction of both lower extremities 02/29/2020    Tremor of both hands 01/27/2020    Hyperuricemia 01/27/2020    Generalized anxiety disorder 01/27/2020    Diabetic peripheral neuropathy ([address] 10/07/2015    Cancer of ureter ([address] 09/30/2015    Chronic peripheral venous hypertension 09/30/2015    Morbid obesity ([address] 09/30/2015    Controlled substance agreement signed 07/16/2015    CAD (coronary artery disease) 09/25/2013    Appendicitis 07/23/2013    CRF (chronic renal failure) 04/16/2013    Type 2 diabetes mellitus with circulatory disorder, with long-term current use of insulin ([address] 02/01/2013    Renal cancer ([address] 06/14/2012    ASHD (arteriosclerotic heart disease) 10/24/2011    Mixed hyperlipidemia 05/11/2011 (HYDRODIURIL) 25 MG tablet TAKE 1 TABLET DAILY 90 tablet 3    furosemide (LASIX) 40 MG tablet TAKE 1 TABLET DAILY 90 tablet 3    atorvastatin (LIPITOR) 20 MG tablet TAKE 1 TABLET DAILY 90 tablet 3    Continuous Blood Gluc Sensor (FREESTYLE CANDACE SENSOR SYSTEM) [facility] Check blood sugar 4-6 times a day 1 each 0    metOLazone (ZAROXOLYN) 2.5 MG tablet Take one tablet once a week. Take 30 minutes before the Lasix. 4 tablet 5    mupirocin (BACTROBAN) 2 % ointment Apply topically 3 times daily. 1 Tube 2    sertraline (ZOLOFT) 50 MG tablet TAKE 2 TABLETS DAILY 180 tablet 3    Probiotic Product (ALIGN PO) Take 1 tablet by mouth daily      Simethicone (GAS-X PO) Take 2 tablets by mouth Three to four times daily as needed      Glucose Blood (BLOOD GLUCOSE TEST STRIPS) STRP [employer] Contour glucose test strips-uses 4x per day or prn with sliding scale coverage   Dx:250.01 Type 1 Diabetes 400 strip 3    ciclopirox (LOPROX) 0.77 % cream Apply to legs, buttocks BID PRN flares 90 g 2    ZACH MICROLET LANCETS MISC Tests 4 to 5 times daily or prn 300 each 3    Polyethylene Glycol 3350 (MIRALAX PO) Take 1 capsule by mouth as needed       B Complex-Biotin-FA (VITAMIN B50 COMPLEX PO) Take 1 tablet by mouth daily.  Vitamin D (CHOLECALCIFEROL) 1000 UNITS CAPS capsule Take 1,000 Units by mouth daily.  latanoprost (XALATAN) 0.005 % ophthalmic solution Place 1 drop into both eyes daily.       docusate sodium (COLACE) 100 MG capsule Take 100 mg by mouth 2 times daily        Current Facility-Administered Medications   Medication Dose Route Frequency Provider Last Rate Last Dose    cyanocobalamin injection 1,000 mcg  1,000 mcg Intramuscular Once Mini Albert MD         Allergies: Shellfish-derived products and Iodides  Past Medical History:   Diagnosis Date    Anesthesia     \"Violent Vomiting After Anesthetic\"    ASHD (arteriosclerotic heart disease) 3/95    Sees Dr. Tom Ludwig Once A Year    CAD (coronary artery disease) 1990    PTCA to LAD    Diabetes mellitus ([facility] Dx 1985    Full dentures     H/O cardiovascular stress test 9/21/12 9/12-EF 70% WNL     H/O cardiovascular stress test 12/15/2014    cardiolite-normal, no ischemia, EF68%    H/O Doppler ultrasound 3/9/11    PERIPHERAL ARTERIAL 3/11-Essentially normal.     H/O Doppler ultrasound 5/18/10    CARTOID: 5/10-Mild to moderate ateriosclerotic plaquing involving the carotid bifurcation, similar in appearance to the previous study with no evidenc of flow-limiting stenosis.      H/O echocardiogram 3/9/11    3/11-EF >55% WNL     H/O kidney removal 3/12    Right    Heart murmur     [place] (hard of hearing)     Wears Bilateral Hearing Aids    Hx of echocardiogram 06/16/2017    EF 55-60 mod LV hypertrophy, Grade 1 diastolic dysfunction, mild aortic stenosis    Hyperlipidemia     Hypertension     Insomnia     Mitral regurgitation     Nausea & vomiting     \"Violent Vomiting After Anesthetic\"    Proteinuria     Restless leg syndrome     Right Kidney Cancer Dx 2-12    Staph infection 2000's    Post Op Back Surgery    Venous stasis ulcer of right lower extremity ([facility] 9/2015    [facility] [address]     Past Surgical History:   Procedure Laterality Date    APPENDECTOMY  7/22/13    laprascopic    ATHERECTOMY  3/95    S/P ROTO OF LAD    BACK SURGERY  2000's    Staph Infection Post Op    COLONOSCOPY  2007    CORONARY ANGIOPLASTY  2000    One Stent    CYST REMOVAL  2005    Back Of Neck    DENTAL SURGERY  1960    All Teeth Extracted    ENDOSCOPY, COLON, DIAGNOSTIC  1990's    OTHER SURGICAL HISTORY  2005    Area Removed From  Back (Benign\"    TONSILLECTOMY AND ADENOIDECTOMY  1950    TOTAL NEPHRECTOMY  3/19/12    Robotic nephroureterectomy    UPPER GASTROINTESTINAL ENDOSCOPY  8/28/2014    gastric polyp    VASECTOMY  1980      As reviewed   Family History   Problem Relation Age of Onset    Early Death Father 59    Heart Disease Father     High Blood Pressure Father      Social History     Tobacco Use    Smoking status: Former Smoker     Packs/day: 1.00     Years: 15.00     Pack years: 15.00     Last attempt to quit: 3/15/2000     Years since quittin.5    Smokeless tobacco: Never Used    Tobacco comment: Reviewed 2015   Substance Use Topics    Alcohol use: No      Review of Systems:    Constitutional: Negative for diaphoresis and fatigue  Psychological:Negative for anxiety or depression  HENT: Negative for headaches, nasal congestion, sinus pain or vertigo  Eyes: Negative for visual disturbance. Endocrine: Negative for polydipsia/polyuria  Respiratory: Negative for shortness of breath  Cardiovascular:  SOB  Gastrointestinal: Negative for abdominal pain or heartburn  Genito-Urinary: Negative for urinary frequency/urgency  Musculoskeletal: Negative for muscle pain, muscular weakness, negative for pain in arm and leg or swelling in foot and leg  Neurological: Negative for dizziness, headaches, memory loss, numbness/tingling, visual changes, syncope  Dermatological: Negative for rash    Objective:    Vitals:    09/15/20 1101   BP: 128/74   Pulse: 64   Weight: 235 lb (106.6 kg)   Height: 5' 4\" (1.626 m)     /74   Pulse 64   Ht 5' 4\" (1.626 m)   Wt 235 lb (106.6 kg)   BMI 40.34 kg/m²     No flowsheet data found. Wt Readings from Last 3 Encounters:   09/15/20 235 lb (106.6 kg)   20 234 lb 12.8 oz (106.5 kg)   20 230 lb 9.6 oz (104.6 kg)     Body mass index is 40.34 kg/m². GENERAL - Alert, oriented, pleasant, in no apparent distress. EYES: No jaundice, no conjunctival pallor. SKIN: It is warm & dry. No rashes. No Echhymosis    HEENT - No clinically significant abnormalities seen. Neck - Supple. No jugular venous distention noted. No carotid bruits. Cardiovascular - Normal S1 and S2 without obvious murmur or gallop. Extremities - No cyanosis, clubbing, or significant edema. Pulmonary - No respiratory distress.   No wheezes or rales. Abdomen - No masses, tenderness, or organomegaly. Musculoskeletal - No significant edema. No joint deformities. No muscle wasting. Neurologic - Cranial nerves II through XII are grossly intact. There were no gross focal neurologic abnormalities. Lab Review   Lab Results   Component Value Date    CKTOTAL 82 01/16/2020     BNP:  No results found for: BNP  PT/INR:    Lab Results   Component Value Date    INR 0.97 05/04/2016     Lab Results   Component Value Date    LABA1C 7.5 07/13/2020    LABA1C 9.1 (H) 01/16/2020     Lab Results   Component Value Date    WBC 6.9 01/16/2020    HCT 45.5 01/16/2020    MCV 93 01/16/2020     01/16/2020     Lab Results   Component Value Date    CHOL 162 01/16/2020    TRIG 119 01/16/2020    HDL 64 01/16/2020    LDLCALC 74 01/16/2020     Lab Results   Component Value Date    ALT 11 01/16/2020    AST 19 01/16/2020     BMP:    Lab Results   Component Value Date     01/16/2020    K 4.1 01/16/2020    CL 93 01/16/2020    CO2 24 01/16/2020    BUN 28 01/16/2020    CREATININE 1.84 01/16/2020     CMP:   Lab Results   Component Value Date     01/16/2020    K 4.1 01/16/2020    CL 93 01/16/2020    CO2 24 01/16/2020    BUN 28 01/16/2020    PROT 6.4 01/16/2020    PROT 6.3 01/22/2013     TSH:    Lab Results   Component Value Date    TSH 2.910 01/16/2020    TSHHS 2.118 12/21/2010       QUALITY MEASURES REVIEWED:    Impression:    No diagnosis found.    Patient Active Problem List   Diagnosis Code    Mixed hyperlipidemia E78.2    Essential hypertension I10    ASHD (arteriosclerotic heart disease) I25.10    Renal cancer ([facility] C64.9    Type 2 diabetes mellitus with circulatory disorder, with long-term current use of insulin ([facility]) E11.59, Z79.4    CRF (chronic renal failure) N18.9    Appendicitis K37    CAD (coronary artery disease) I25.10    Controlled substance agreement signed Z79.899    Diabetic peripheral neuropathy ([facility] [address] E11.42    Cancer of ureter

## 2020-09-15 NOTE — LETTER
Paloma Roberts  1945  [id number]    Have you had any Chest Pain - No    Have you had any Shortness of Breath - Yes  If Yes - When on exertion    Have you had any dizziness - No    Have you had any palpitations - No    Do you have any edema - swelling in legs , feet, ankles   If Yes - CHECK TO SEE IF THE EDEMA IS PITTING  How long have they been having edema - Years  If Yes - Have they worn compression stockings Yes    Do you have a surgery or procedure scheduled in the near future - No    Ask patient [facility] if they are not already [facility]    Check to see if we have an E-MAIL on file for the patient    Check medication list thoroughly!!!  BE SURE TO ASK PATIENT IF THEY NEED MEDICATION REFILLS

## 2020-10-06 ENCOUNTER — OFFICE VISIT (OUTPATIENT)
Dept: FAMILY MEDICINE CLINIC | Age: 75
End: 2020-10-06
Payer: MEDICARE

## 2020-10-06 VITALS
SYSTOLIC BLOOD PRESSURE: 144 MMHG | WEIGHT: 227 LBS | OXYGEN SATURATION: 96 % | HEIGHT: 62 IN | TEMPERATURE: 96.8 F | DIASTOLIC BLOOD PRESSURE: 70 MMHG | HEART RATE: 70 BPM | BODY MASS INDEX: 41.77 KG/M2

## 2020-10-06 LAB
BUN BLDV-MCNC: 15 MG/DL
CALCIUM SERPL-MCNC: 8.3 MG/DL
CHLORIDE BLD-SCNC: 102 MMOL/L
CO2: 26 MMOL/L
CREAT SERPL-MCNC: 1.6 MG/DL
GFR CALCULATED: 51
GLUCOSE BLD-MCNC: 131 MG/DL
POTASSIUM SERPL-SCNC: 4.4 MMOL/L
SODIUM BLD-SCNC: 135 MMOL/L

## 2020-10-06 PROCEDURE — 99214 OFFICE O/P EST MOD 30 MIN: CPT | Performed by: PHYSICIAN ASSISTANT

## 2020-10-06 RX ORDER — OXYCODONE HYDROCHLORIDE AND ACETAMINOPHEN 5; 325 MG/1; MG/1
1 TABLET ORAL EVERY 8 HOURS PRN
Qty: 30 TABLET | Refills: 0 | Status: SHIPPED | OUTPATIENT
Start: 2020-10-06 | End: 2020-10-06 | Stop reason: SDUPTHER

## 2020-10-06 RX ORDER — CLOPIDOGREL BISULFATE 75 MG/1
75 TABLET ORAL DAILY
COMMUNITY

## 2020-10-06 RX ORDER — DIMENHYDRINATE 50 MG
TABLET ORAL
COMMUNITY

## 2020-10-06 RX ORDER — LORAZEPAM 0.5 MG/1
0.5 TABLET ORAL EVERY 6 HOURS PRN
COMMUNITY
End: 2020-11-12

## 2020-10-06 RX ORDER — ANTACID TABLETS 500 MG/1
TABLET, CHEWABLE ORAL
COMMUNITY

## 2020-10-06 RX ORDER — OXYCODONE HYDROCHLORIDE AND ACETAMINOPHEN 5; 325 MG/1; MG/1
1 TABLET ORAL EVERY 4 HOURS PRN
COMMUNITY
End: 2020-10-06 | Stop reason: SDUPTHER

## 2020-10-06 RX ORDER — ASPIRIN 81 MG/1
81 TABLET, CHEWABLE ORAL DAILY
COMMUNITY

## 2020-10-06 RX ORDER — HYDRALAZINE HYDROCHLORIDE 50 MG/1
50 TABLET, FILM COATED ORAL 3 TIMES DAILY
COMMUNITY

## 2020-10-06 RX ORDER — OXYCODONE HYDROCHLORIDE AND ACETAMINOPHEN 5; 325 MG/1; MG/1
1 TABLET ORAL EVERY 8 HOURS PRN
Qty: 30 TABLET | Refills: 0 | Status: SHIPPED | OUTPATIENT
Start: 2020-10-06 | End: 2020-10-13 | Stop reason: SDUPTHER

## 2020-10-06 RX ORDER — FLUCONAZOLE 150 MG/1
150 TABLET ORAL ONCE
COMMUNITY

## 2020-10-06 NOTE — PROGRESS NOTES
or weakness  Eyes:  Denies photophobia or discharge  ENT:  Denies cold-like symptoms  Cardiovascular: Admits chronic bilateral lower extremity edema without baseline change. Denies chest pain, palpitations. Respiratory:  Denies cough or shortness of breath  GI:  Denies abdominal pain, nausea, vomiting, or diarrhea  Musculoskeletal: Admits chronic low back pain. See HPI  Skin:  No rashes  Neurologic:  Denies headache, focal weakness, or sensory changes  Endocrine:  Denies polyuria or polydipsia  Lymphatic:  Denies swollen glands  Psychiatric:  Deniessuicidal ideation or homicidal ideation    PAST MEDICAL HISTORY  Past Medical History:   Diagnosis Date    Anesthesia     \"Violent Vomiting After Anesthetic\"    ASHD (arteriosclerotic heart disease) 3/95    Sees Dr. Abhi Kitchen Once A Year    CAD (coronary artery disease) 1990    PTCA to LAD    Diabetes mellitus ([facility] [address] Dx 1985    Full dentures     H/O cardiovascular stress test 9/21/12 9/12-EF 70% WNL     H/O cardiovascular stress test 12/15/2014    cardiolite-normal, no ischemia, EF68%    H/O Doppler ultrasound 3/9/11    PERIPHERAL ARTERIAL 3/11-Essentially normal.     H/O Doppler ultrasound 5/18/10    CARTOID: 5/10-Mild to moderate ateriosclerotic plaquing involving the carotid bifurcation, similar in appearance to the previous study with no evidenc of flow-limiting stenosis.      H/O echocardiogram 3/9/11    3/11-EF >55% WNL     H/O kidney removal 3/12    Right    Heart murmur     [place] (hard of hearing)     Wears Bilateral Hearing Aids    Hx of echocardiogram 06/16/2017    EF 55-60 mod LV hypertrophy, Grade 1 diastolic dysfunction, mild aortic stenosis    Hyperlipidemia     Hypertension     Insomnia     Mitral regurgitation     Nausea & vomiting     \"Violent Vomiting After Anesthetic\"    Proteinuria     Restless leg syndrome     Right Kidney Cancer Dx 2-12    Staph infection 2000's    Post Op Back Surgery    Venous stasis ulcer of right lower extremity ([address] 2015    [facility]       FAMILY HISTORY  Family History   Problem Relation Age of Onset    Early Death Father 59    Heart Disease Father     High Blood Pressure Father        SOCIAL HISTORY  Social History     Socioeconomic History    Marital status:      Spouse name: None    Number of children: None    Years of education: None    Highest education level: None   Occupational History    None   Social Needs    Financial resource strain: None    Food insecurity     Worry: None     Inability: None    Transportation needs     Medical: None     Non-medical: None   Tobacco Use    Smoking status: Former Smoker     Packs/day: 1.00     Years: 15.00     Pack years: 15.00     Last attempt to quit: 3/15/2000     Years since quittin.5    Smokeless tobacco: Never Used    Tobacco comment: Reviewed 2015   Substance and Sexual Activity    Alcohol use: No    Drug use: No    Sexual activity: Not Currently   Lifestyle    Physical activity     Days per week: None     Minutes per session: None    Stress: None   Relationships    Social connections     Talks on phone: None     Gets together: None     Attends Voodoo service: None     Active member of club or organization: None     Attends meetings of clubs or organizations: None     Relationship status: None    Intimate partner violence     Fear of current or ex partner: None     Emotionally abused: None     Physically abused: None     Forced sexual activity: None   Other Topics Concern    None   Social History Narrative    None        SURGICAL HISTORY  Past Surgical History:   Procedure Laterality Date    APPENDECTOMY  13    laprascopic    ATHERECTOMY  3/95    S/P ROTO OF LAD    BACK SURGERY      Staph Infection Post Op    COLONOSCOPY      CORONARY ANGIOPLASTY      One Stent    CYST REMOVAL      Back Of Neck    DENTAL SURGERY      All Teeth Extracted    ENDOSCOPY, COLON, DIAGNOSTIC [address]  2005    Area Removed From  Back (Benign\"    TONSILLECTOMY AND ADENOIDECTOMY  1950    TOTAL NEPHRECTOMY  3/19/12    Robotic nephroureterectomy    UPPER GASTROINTESTINAL ENDOSCOPY  8/28/2014    gastric polyp    VASECTOMY  1980       CURRENT MEDICATIONS  Current Outpatient Medications   Medication Sig Dispense Refill    aspirin 81 MG chewable tablet Take 81 mg by mouth daily      Calcium Carbonate 500 MG CHEW Take by mouth      clopidogrel (PLAVIX) 75 MG tablet Take 75 mg by mouth daily      hydrALAZINE (APRESOLINE) 50 MG tablet Take 50 mg by mouth 3 times daily      metoprolol tartrate (LOPRESSOR) 25 MG tablet Take 25 mg by mouth 2 times daily      LORazepam (ATIVAN) 0.5 MG tablet Take 0.5 mg by mouth every 6 hours as needed for Anxiety.  fluconazole (DIFLUCAN) 150 MG tablet Take 150 mg by mouth once      Coenzyme Q10 (CO Q-10) 100 MG CAPS Take by mouth      oxyCODONE-acetaminophen (PERCOCET) 5-325 MG per tablet Take 1 tablet by mouth every 8 hours as needed for Pain for up to 10 days.  30 tablet 0    nitroGLYCERIN (NITROSTAT) 0.4 MG SL tablet Place 1 tablet under the tongue every 5 minutes as needed for Chest pain 50 tablet 1    carbidopa-levodopa (SINEMET)  MG per tablet TAKE ONE AND ONE-HALF TABLETS NIGHTLY 135 tablet 3    DULoxetine (CYMBALTA) 30 MG extended release capsule TAKE 1 CAPSULE DAILY 90 capsule 3    allopurinol (ZYLOPRIM) 100 MG tablet Take 2 tablets by mouth daily 180 tablet 3    tiZANidine (ZANAFLEX) 2 MG capsule TAKE 1 CAPSULE TWICE A DAY AS NEEDED FOR MUSCLE SPASMS 180 capsule 3    insulin NPH (HUMULIN N) 100 UNIT/ML injection vial INJECT 35 UNITS IN THE MORNING AND 10 UNITS AT SUPPER 50 mL 3    HUMALOG 100 UNIT/ML injection cartridge 6 to 8 units with meals twice a day 5 Cartridge 3    gabapentin (NEURONTIN) 300 MG capsule TAKE 1 CAPSULE IN THE AFTERNOON AND 2 CAPSULES AT BEDTIME 270 capsule 3    Insulin Syringe-Needle U-100 (BD INSULIN SYRINGE U/F) 30G X 1/2\" 0.3 ML MISC INJECT AND USE TWICE A  each 4    potassium chloride (KLOR-CON M) 20 MEQ extended release tablet TAKE 1 TABLET TWICE A  tablet 3    CARTIA  MG extended release capsule TAKE 1 CAPSULE DAILY 90 capsule 3    traZODone (DESYREL) 100 MG tablet TAKE 1 TABLET NIGHTLY 90 tablet 3    omeprazole (PRILOSEC) 40 MG delayed release capsule TAKE 1 CAPSULE DAILY 90 capsule 3    furosemide (LASIX) 40 MG tablet TAKE 1 TABLET DAILY 90 tablet 3    atorvastatin (LIPITOR) 20 MG tablet TAKE 1 TABLET DAILY 90 tablet 3    metOLazone (ZAROXOLYN) 2.5 MG tablet Take one tablet once a week. Take 30 minutes before the Lasix. 4 tablet 5    mupirocin (BACTROBAN) 2 % ointment Apply topically 3 times daily. 1 Tube 2    sertraline (ZOLOFT) 50 MG tablet TAKE 2 TABLETS DAILY 180 tablet 3    Probiotic Product (ALIGN PO) Take 1 tablet by mouth daily      Simethicone (GAS-X PO) Take 2 tablets by mouth Three to four times daily as needed      Glucose Blood (BLOOD GLUCOSE TEST STRIPS) [employer]P [employer] Contour glucose test strips-uses 4x per day or prn with sliding scale coverage   Dx:250.01 Type 1 Diabetes 400 strip 3    ZACH MICROLET LANCETS MISC Tests 4 to 5 times daily or prn 300 each 3    Polyethylene Glycol 3350 (MIRALAX PO) Take 1 capsule by mouth as needed       B Complex-Biotin-FA (VITAMIN B50 COMPLEX PO) Take 1 tablet by mouth daily.  Vitamin D (CHOLECALCIFEROL) 1000 UNITS CAPS capsule Take 1,000 Units by mouth daily.  latanoprost (XALATAN) 0.005 % ophthalmic solution Place 1 drop into both eyes daily.  docusate sodium (COLACE) 100 MG capsule Take 100 mg by mouth 2 times daily       dorzolamide-timolol (COSOPT) 22.3-6.8 MG/ML ophthalmic solution 1 drop 2 times daily Both eyes twice a day.       Continuous Blood Gluc  (FREESTYLE CANDACE READER) SARAH Use as directed 1 Device 0    Continuous Blood Gluc Sensor (FREESTYLE CANDACE SENSOR SYSTEM) [facility] Use as directed 4 each 3    Continuous Blood Gluc Sensor (FREESTYLE CANDACE SENSOR SYSTEM) [facility] Check blood sugar 4-6 times a day 1 each 0    ciclopirox (LOPROX) 0.77 % cream Apply to legs, buttocks BID PRN flares 90 g 2     Current Facility-Administered Medications   Medication Dose Route Frequency Provider Last Rate Last Dose    cyanocobalamin injection 1,000 mcg  1,000 mcg Intramuscular Once Malachi Layne MD           ALLERGIES  Allergies   Allergen Reactions    Shellfish-Derived Products      \"Coma\"    Iodides        PHYSICAL EXAM    BP (!) 144/70   Pulse 70   Temp 96.8 °F (36 °C)   Ht 5' 2\" (1.575 m)   Wt 227 lb (103 kg)   SpO2 96%   BMI 41.52 kg/m²     Constitutional:  Well developed, well nourished  HENT:  Normocephalic, atraumatic  Eyes:  conjunctiva normal, no discharge, no scleral icterus  Neck:  No tenderness, supple  Lymphatic:  No lymphadenopathy noted  Cardiovascular:  Normal heart rate, normal rhythm, no murmurs, gallops or rubs  Thorax & Lungs:  Normal breath sounds, no respiratory distress, no wheezing  Skin:  Warm, dry, no erythema, no rash  Back:  No CVA tenderness  Extremities: 1+ nonpitting edema bilateral lower extremities. No tenderness, no cyanosis, no clubbing  Neurologic:  Alert & oriented  Psychiatric:  Affect normal, mood normal    ASSESSMENT & PLAN    Olivia Floor was seen today for follow-up from hospital.    Diagnoses and all orders for this visit:    Hospital discharge follow-up  Follow-up with nephrology and cardiology next week as planned. Continue home health care as planned. Essential hypertension  Continue to monitor blood pressure daily and take the hydralazine 3 times daily as needed. Status post non-ST elevation myocardial infarction (NSTEMI)    Spinal stenosis of lumbar region without neurogenic claudication  -     oxyCODONE-acetaminophen (PERCOCET) 5-325 MG per tablet; Take 1 tablet by mouth every 8 hours as needed for Pain for up to 10 days.   Patient has a well-established history of chronic low back pain and receives epidural injections every 2 to 3 months. He relies on Percocet and unfortunately, patient's primary care provider cannot get him in soon. I discussed this with my  who states that she will call over to the the PCP office and make sure that 1 of the providers sees patient within the next 10 days. Therefore, I will refill a 10-day supply of Percocet. PDMP reviewed with no signs of abuse or diversion. Medications Discontinued During This Encounter   Medication Reason    hydrochlorothiazide (HYDRODIURIL) 25 MG tablet LIST CLEANUP    oxyCODONE-acetaminophen (PERCOCET) 5-325 MG per tablet REORDER    oxyCODONE-acetaminophen (PERCOCET) 5-325 MG per tablet REORDER    oxyCODONE-acetaminophen (PERCOCET) 5-325 MG per tablet REORDER        No follow-ups on file. Plan of care reviewed with patient who verbalizes understanding and wishes to continue. Patient verbalizes understanding with the above plan and is in agreement. Patient will call with  worsening of symptoms, questions or concerns. Please note that this chart was generated using dragon dictation software. Although every effort was made to ensure the accuracy of this automated transcription, some errors in transcription may have occurred.     Electronically signed by Shahid Purcell PA-C on 10/6/2020

## 2020-10-12 ENCOUNTER — TELEPHONE (OUTPATIENT)
Dept: INTERNAL MEDICINE CLINIC | Age: 75
End: 2020-10-12

## 2020-10-12 NOTE — TELEPHONE ENCOUNTER
The patient's wife called and asked for a refill for the Percocet as he will be out in a week and unable to get an appt within the next week.

## 2020-10-13 ENCOUNTER — OFFICE VISIT (OUTPATIENT)
Dept: INTERNAL MEDICINE CLINIC | Age: 75
End: 2020-10-13
Payer: MEDICARE

## 2020-10-13 VITALS
HEART RATE: 66 BPM | WEIGHT: 227 LBS | DIASTOLIC BLOOD PRESSURE: 72 MMHG | OXYGEN SATURATION: 99 % | TEMPERATURE: 97.1 F | SYSTOLIC BLOOD PRESSURE: 134 MMHG | BODY MASS INDEX: 41.52 KG/M2

## 2020-10-13 PROCEDURE — G0008 ADMIN INFLUENZA VIRUS VAC: HCPCS | Performed by: FAMILY MEDICINE

## 2020-10-13 PROCEDURE — 90694 VACC AIIV4 NO PRSRV 0.5ML IM: CPT | Performed by: FAMILY MEDICINE

## 2020-10-13 PROCEDURE — 3051F HG A1C>EQUAL 7.0%<8.0%: CPT | Performed by: FAMILY MEDICINE

## 2020-10-13 PROCEDURE — 99203 OFFICE O/P NEW LOW 30 MIN: CPT | Performed by: FAMILY MEDICINE

## 2020-10-13 RX ORDER — POTASSIUM CHLORIDE 20 MEQ/1
TABLET, EXTENDED RELEASE ORAL
Qty: 180 TABLET | Refills: 0 | Status: SHIPPED | OUTPATIENT
Start: 2020-10-13

## 2020-10-13 RX ORDER — OXYCODONE HYDROCHLORIDE AND ACETAMINOPHEN 5; 325 MG/1; MG/1
1 TABLET ORAL EVERY 8 HOURS PRN
Qty: 90 TABLET | Refills: 0 | Status: SHIPPED | OUTPATIENT
Start: 2020-10-13 | End: 2020-11-12 | Stop reason: SDUPTHER

## 2020-10-13 ASSESSMENT — ENCOUNTER SYMPTOMS
COUGH: 0
CONSTIPATION: 0
CHEST TIGHTNESS: 0
NAUSEA: 0
ABDOMINAL PAIN: 0
DIARRHEA: 0
SHORTNESS OF BREATH: 0

## 2020-10-13 NOTE — PROGRESS NOTES
PTCA to LAD    Diabetes mellitus ([address] Dx 1985    Full dentures     Glaucoma     H/O cardiovascular stress test 9/21/12 9/12-EF 70% WNL     H/O cardiovascular stress test 12/15/2014    cardiolite-normal, no ischemia, EF68%    H/O Doppler ultrasound 3/9/11    PERIPHERAL ARTERIAL 3/11-Essentially normal.     H/O Doppler ultrasound 5/18/10    CARTOID: 5/10-Mild to moderate ateriosclerotic plaquing involving the carotid bifurcation, similar in appearance to the previous study with no evidenc of flow-limiting stenosis.      H/O echocardiogram 3/9/11    3/11-EF >55% WNL     H/O kidney removal 3/12    Right    Hard of hearing     Heart murmur     [place] (hard of hearing)     Wears Bilateral Hearing Aids    Hx of echocardiogram 06/16/2017    EF 55-60 mod LV hypertrophy, Grade 1 diastolic dysfunction, mild aortic stenosis    Hyperlipidemia     Hypertension     Insomnia     Mitral regurgitation     Nausea & vomiting     \"Violent Vomiting After Anesthetic\"    TATYANA (obstructive sleep apnea)     Proteinuria     Restless leg syndrome     Right Kidney Cancer Dx 2-12    Spinal stenosis     Staph infection 2000's    Post Op Back Surgery    Venous stasis ulcer of right lower extremity ([address] 9/2015    [facility] [address]       Past Surgical History:   Procedure Laterality Date    APPENDECTOMY  7/22/13    laprascopic    ATHERECTOMY  3/95    S/P ROTO OF LAD    BACK SURGERY  2000's    Staph Infection Post Op    COLONOSCOPY  2007    CORONARY ANGIOPLASTY  2000    One Stent    CYST REMOVAL  2005    Back Of Neck    DENTAL SURGERY  1960    All Teeth Extracted    ENDOSCOPY, COLON, DIAGNOSTIC  1990's    OTHER SURGICAL HISTORY  2005    Area Removed From  Back (Benign\"    TONSILLECTOMY AND ADENOIDECTOMY  1950    TOTAL NEPHRECTOMY  3/19/12    Robotic nephroureterectomy    UPPER GASTROINTESTINAL ENDOSCOPY  8/28/2014    gastric polyp    VASECTOMY  1980       Family History   Problem Relation Age of Onset    Early Death Father 59    Heart Disease Father     High Blood Pressure Father        Social History     Tobacco Use    Smoking status: Former Smoker     Packs/day: 1.00     Years: 15.00     Pack years: 15.00     Last attempt to quit: 3/15/2000     Years since quittin.6    Smokeless tobacco: Never Used    Tobacco comment: Reviewed 2015   Substance Use Topics    Alcohol use: No    Drug use: No       Current Outpatient Medications   Medication Sig Dispense Refill    oxyCODONE-acetaminophen (PERCOCET) 5-325 MG per tablet Take 1 tablet by mouth every 8 hours as needed for Pain for up to 30 days. 90 tablet 0    aspirin 81 MG chewable tablet Take 81 mg by mouth daily      Calcium Carbonate 500 MG CHEW Take by mouth      clopidogrel (PLAVIX) 75 MG tablet Take 75 mg by mouth daily      hydrALAZINE (APRESOLINE) 50 MG tablet Take 50 mg by mouth 3 times daily      metoprolol tartrate (LOPRESSOR) 25 MG tablet Take 25 mg by mouth 2 times daily      LORazepam (ATIVAN) 0.5 MG tablet Take 0.5 mg by mouth every 6 hours as needed for Anxiety.       fluconazole (DIFLUCAN) 150 MG tablet Take 150 mg by mouth once      Coenzyme Q10 (CO Q-10) 100 MG CAPS Take by mouth      nitroGLYCERIN (NITROSTAT) 0.4 MG SL tablet Place 1 tablet under the tongue every 5 minutes as needed for Chest pain 50 tablet 1    carbidopa-levodopa (SINEMET)  MG per tablet TAKE ONE AND ONE-HALF TABLETS NIGHTLY 135 tablet 3    DULoxetine (CYMBALTA) 30 MG extended release capsule TAKE 1 CAPSULE DAILY 90 capsule 3    allopurinol (ZYLOPRIM) 100 MG tablet Take 2 tablets by mouth daily 180 tablet 3    tiZANidine (ZANAFLEX) 2 MG capsule TAKE 1 CAPSULE TWICE A DAY AS NEEDED FOR MUSCLE SPASMS 180 capsule 3    insulin NPH (HUMULIN N) 100 UNIT/ML injection vial INJECT 35 UNITS IN THE MORNING AND 10 UNITS AT SUPPER 50 mL 3    HUMALOG 100 UNIT/ML injection cartridge 6 to 8 units with meals twice a day 5 Cartridge 3    gabapentin (NEURONTIN) 300 MG capsule TAKE 1 CAPSULE IN THE AFTERNOON AND 2 CAPSULES AT BEDTIME 270 capsule 3    Insulin Syringe-Needle U-100 (BD INSULIN SYRINGE U/F) 30G X 1/2\" 0.3 ML MISC INJECT AND USE TWICE A  each 4    dorzolamide-timolol (COSOPT) 22.3-6.8 MG/ML ophthalmic solution 1 drop 2 times daily Both eyes twice a day.  Continuous Blood Gluc  (FREESTYLE CANDACE READER) SARAH Use as directed 1 Device 0    Continuous Blood Gluc Sensor (FREESTYLE CANDACE SENSOR SYSTEM) [facility] Use as directed 4 each 3    CARTIA  MG extended release capsule TAKE 1 CAPSULE DAILY 90 capsule 3    atorvastatin (LIPITOR) 20 MG tablet TAKE 1 TABLET DAILY 90 tablet 3    Continuous Blood Gluc Sensor (FREESTYLE CANDACE SENSOR SYSTEM) [facility] Check blood sugar 4-6 times a day 1 each 0    metOLazone (ZAROXOLYN) 2.5 MG tablet Take one tablet once a week. Take 30 minutes before the Lasix. 4 tablet 5    mupirocin (BACTROBAN) 2 % ointment Apply topically 3 times daily. 1 Tube 2    Probiotic Product (ALIGN PO) Take 1 tablet by mouth daily      Simethicone (GAS-X PO) Take 2 tablets by mouth Three to four times daily as needed      Glucose Blood (BLOOD GLUCOSE TEST STRIPS) STRP [employer] Contour glucose test strips-uses 4x per day or prn with sliding scale coverage   Dx:250.01 Type 1 Diabetes 400 strip 3    ciclopirox (LOPROX) 0.77 % cream Apply to legs, buttocks BID PRN flares 90 g 2    JOSELUIS MICROLET LANCETS MISC Tests 4 to 5 times daily or prn 300 each 3    Polyethylene Glycol 3350 (MIRALAX PO) Take 1 capsule by mouth as needed       B Complex-Biotin-FA (VITAMIN B50 COMPLEX PO) Take 1 tablet by mouth daily.  Vitamin D (CHOLECALCIFEROL) 1000 UNITS CAPS capsule Take 1,000 Units by mouth daily.  latanoprost (XALATAN) 0.005 % ophthalmic solution Place 1 drop into both eyes daily.       docusate sodium (COLACE) 100 MG capsule Take 100 mg by mouth 2 times daily       omeprazole (PRILOSEC) 40 MG delayed release capsule TAKE 1 CAPSULE DAILY 90 capsule 0    traZODone (DESYREL) 100 MG tablet TAKE 1 TABLET NIGHTLY 90 tablet 0    furosemide (LASIX) 40 MG tablet TAKE 1 TABLET DAILY 90 tablet 0    potassium chloride (KLOR-CON M) 20 MEQ extended release tablet TAKE 1 TABLET TWICE A  tablet 0     Current Facility-Administered Medications   Medication Dose Route Frequency Provider Last Rate Last Dose    cyanocobalamin injection 1,000 mcg  1,000 mcg Intramuscular Once Jame Martinez MD           OBJECTIVE:    /72   Pulse 66   Temp 97.1 °F (36.2 °C)   Wt 227 lb (103 kg)   SpO2 99%   BMI 41.52 kg/m²     Physical Exam  Vitals signs reviewed. Constitutional:       General: He is not in acute distress. Appearance: He is well-developed. HENT:      Head: Normocephalic and atraumatic. Ears:      Comments: Hearing aids     Nose: Nose normal.      Mouth/Throat:      Mouth: Mucous membranes are moist.   Eyes:      Extraocular Movements: Extraocular movements intact. Conjunctiva/sclera: Conjunctivae normal.      Pupils: Pupils are equal, round, and reactive to light. Neck:      Musculoskeletal: Neck supple. Cardiovascular:      Rate and Rhythm: Normal rate and regular rhythm. Pulmonary:      Effort: Pulmonary effort is normal. No respiratory distress. Breath sounds: Normal breath sounds. Abdominal:      General: Bowel sounds are normal.      Palpations: Abdomen is soft. Tenderness: There is no abdominal tenderness. Musculoskeletal:      Right lower leg: No edema. Left lower leg: No edema. Neurological:      Mental Status: He is alert and oriented to person, place, and time. Cranial Nerves: No cranial nerve deficit. Psychiatric:         Mood and Affect: Mood normal.         ASSESSMENT:  1. Spinal stenosis of lumbar region without neurogenic claudication    2. Type 2 diabetes mellitus with diabetic peripheral angiopathy without gangrene, with long-term current use of insulin ([address]    3. Essential hypertension    4. Malignant neoplasm of urinary bladder, unspecified site ([address]    5. BMI 40.0-44.9, adult ([address]    6. Status post non-ST elevation myocardial infarction (NSTEMI)    7. Need for immunization against influenza        PLAN:    Orders Placed This Encounter   Procedures    INFLUENZA, QUADV, ADJUVANTED, 72 YRS =, IM, PF, PREFILL SYR, 0.5ML (FLUAD)    AFL - Alyssia Pantoja MD, Pain Management, [place]       Orders Placed This Encounter   Medications    oxyCODONE-acetaminophen (PERCOCET) 5-325 MG per tablet     Sig: Take 1 tablet by mouth every 8 hours as needed for Pain for up to 30 days. Dispense:  90 tablet     Refill:  0     Reduce doses taken as pain becomes manageable   Oarrs checked  Refill on Percocet 1 month. Pt and wife aware that he must establish with pain management for narcotics or receive pain medication from current pain management specialists  ADR's explained  Continue medications  Labs reviewed  The patient is asked to make an attempt to improve diet and exercise patterns to aid in medical management of this problem. Keep f/u with specialists- PM, cardiology, nephrology etc.       Return in about 1 month (around 11/13/2020) for Spinal stenosis.     Electronically Signed by Beatriz Giordano DO

## 2020-10-15 RX ORDER — FUROSEMIDE 40 MG/1
TABLET ORAL
Qty: 90 TABLET | Refills: 0 | Status: SHIPPED | OUTPATIENT
Start: 2020-10-15

## 2020-10-16 RX ORDER — OMEPRAZOLE 40 MG/1
CAPSULE, DELAYED RELEASE ORAL
Qty: 90 CAPSULE | Refills: 0 | Status: SHIPPED | OUTPATIENT
Start: 2020-10-16

## 2020-10-16 RX ORDER — TRAZODONE HYDROCHLORIDE 100 MG/1
TABLET ORAL
Qty: 90 TABLET | Refills: 0 | Status: SHIPPED | OUTPATIENT
Start: 2020-10-16

## 2020-10-18 ASSESSMENT — ENCOUNTER SYMPTOMS: BACK PAIN: 1

## 2020-10-30 ENCOUNTER — TELEPHONE (OUTPATIENT)
Dept: INTERNAL MEDICINE CLINIC | Age: 75
End: 2020-10-30

## 2020-10-30 NOTE — TELEPHONE ENCOUNTER
Confirm that patient is on Zoloft and Cymbalta or did Dr. Mayela Goodrich stop one of the meds.  It is on his list, but both are antidepressants

## 2020-11-12 ENCOUNTER — OFFICE VISIT (OUTPATIENT)
Dept: INTERNAL MEDICINE CLINIC | Age: 75
End: 2020-11-12
Payer: MEDICARE

## 2020-11-12 VITALS
OXYGEN SATURATION: 99 % | WEIGHT: 228 LBS | HEART RATE: 63 BPM | TEMPERATURE: 98.6 F | SYSTOLIC BLOOD PRESSURE: 130 MMHG | BODY MASS INDEX: 41.7 KG/M2 | DIASTOLIC BLOOD PRESSURE: 78 MMHG

## 2020-11-12 PROCEDURE — 99214 OFFICE O/P EST MOD 30 MIN: CPT | Performed by: FAMILY MEDICINE

## 2020-11-12 PROCEDURE — 3051F HG A1C>EQUAL 7.0%<8.0%: CPT | Performed by: FAMILY MEDICINE

## 2020-11-12 RX ORDER — FLASH GLUCOSE SENSOR
KIT MISCELLANEOUS
Qty: 1 DEVICE | Refills: 0 | Status: SHIPPED | OUTPATIENT
Start: 2020-11-12

## 2020-11-12 RX ORDER — FLASH GLUCOSE SENSOR
KIT MISCELLANEOUS
Qty: 1 EACH | Refills: 3 | Status: SHIPPED | OUTPATIENT
Start: 2020-11-12

## 2020-11-12 RX ORDER — OXYCODONE HYDROCHLORIDE AND ACETAMINOPHEN 5; 325 MG/1; MG/1
1 TABLET ORAL EVERY 8 HOURS PRN
Qty: 90 TABLET | Refills: 0 | Status: SHIPPED | OUTPATIENT
Start: 2020-11-12 | End: 2020-12-12

## 2020-11-12 ASSESSMENT — ENCOUNTER SYMPTOMS
SHORTNESS OF BREATH: 0
CONSTIPATION: 0
BACK PAIN: 1
ABDOMINAL PAIN: 0
NAUSEA: 0
DIARRHEA: 0
COUGH: 0

## 2020-11-12 NOTE — PROGRESS NOTES
Antonia Estrada  1945  67 y.o.  male    Chief Complaint   Patient presents with    1 Month Follow-Up         History of Present Illness  Antonia Estrada is a 67 y.o. male who presents today for a check up. Patient Active Problem List    Diagnosis Date Noted    Spinal stenosis of lumbar region without neurogenic claudication 07/13/2020    Bilateral lower extremity edema 07/13/2020    Gastroesophageal reflux disease without esophagitis 07/13/2020    Chronic insomnia 02/29/2020    Tremor of both hands 01/27/2020    Hyperuricemia 01/27/2020    Generalized anxiety disorder 01/27/2020    Diabetic peripheral neuropathy ([facility] [address] 10/07/2015    Cancer of ureter ([facility] [address] 09/30/2015    Chronic peripheral venous hypertension 09/30/2015    Morbid obesity ([facility] [address] 09/30/2015    Controlled substance agreement signed 07/16/2015    CRF (chronic renal failure) 04/16/2013    Type 2 diabetes mellitus with circulatory disorder, with long-term current use of insulin ([facility] [address] 02/01/2013    Renal cancer ([facility] [address] 06/14/2012    ASHD (arteriosclerotic heart disease) 10/24/2011    Mixed hyperlipidemia 05/11/2011    Essential hypertension 05/11/2011   He has chronic lumbar spinal stenosis and has been on chronic Percocet. He is in pain management with Dr. Virgilio Mills [facility]. He states he will not prescribe the pain medications, but he will do the injections. He will use Gabapentin, and Cymbalta for pain. Pt to establish with PCP closer to his home . +DM II-insulin dependent. +Neuropathy. Last Hba1c 7.5. No low BS. No concerns  +HTN, CAD, CHF, VHD - follows with cardiology- stable on medications. +CKD- Follows with cardiology  +Depression- He has been on Zoloft and denies problems on this medication. Review of Systems   Constitutional: Negative for diaphoresis and fever. Respiratory: Negative for cough and shortness of breath. Cardiovascular: Negative for chest pain and palpitations.    Gastrointestinal: Negative for abdominal pain, constipation, diarrhea and nausea. Genitourinary: Negative for difficulty urinating. Musculoskeletal: Positive for back pain and gait problem (ambulates with walker). Neurological: Negative for dizziness and headaches. Allergies   Allergen Reactions    Shellfish-Derived Products      \"Coma\"    Iodides        Past Medical History:   Diagnosis Date    Anesthesia     \"Violent Vomiting After Anesthetic\"    ASHD (arteriosclerotic heart disease) 3/95    Sees Dr. Alhaji Weaver Once A Year    CAD (coronary artery disease) 1990    PTCA to LAD    Diabetes mellitus ([address] Dx 1985    Full dentures     Glaucoma     H/O cardiovascular stress test 9/21/12 9/12-EF 70% WNL     H/O cardiovascular stress test 12/15/2014    cardiolite-normal, no ischemia, EF68%    H/O Doppler ultrasound 3/9/11    PERIPHERAL ARTERIAL 3/11-Essentially normal.     H/O Doppler ultrasound 5/18/10    CARTOID: 5/10-Mild to moderate ateriosclerotic plaquing involving the carotid bifurcation, similar in appearance to the previous study with no evidenc of flow-limiting stenosis.      H/O echocardiogram 3/9/11    3/11-EF >55% WNL     H/O kidney removal 3/12    Right    Hard of hearing     Heart murmur     Ione (hard of hearing)     Wears Bilateral Hearing Aids    Hx of echocardiogram 06/16/2017    EF 55-60 mod LV hypertrophy, Grade 1 diastolic dysfunction, mild aortic stenosis    Hyperlipidemia     Hypertension     Insomnia     Mitral regurgitation     Nausea & vomiting     \"Violent Vomiting After Anesthetic\"    TATYANA (obstructive sleep apnea)     Proteinuria     Restless leg syndrome     Right Kidney Cancer Dx 2-12    Spinal stenosis     Staph infection 2000's    Post Op Back Surgery    Venous stasis ulcer of right lower extremity ([facility] [address] 9/2015    [facility] [address]       Past Surgical History:   Procedure Laterality Date    APPENDECTOMY  7/22/13    laprascopic    ATHERECTOMY  3/95    S/P ROTO OF LAD    BACK SURGERY 's    Staph Infection Post Op    COLONOSCOPY      CORONARY ANGIOPLASTY  2000    One Stent    CYST REMOVAL      Back Of Neck    DENTAL SURGERY  1960    All Teeth Extracted    ENDOSCOPY, COLON, DIAGNOSTIC  's    OTHER SURGICAL HISTORY      Area Removed From  Back (Benign\"    TONSILLECTOMY AND ADENOIDECTOMY  1950    TOTAL NEPHRECTOMY  3/19/12    Robotic nephroureterectomy    UPPER GASTROINTESTINAL ENDOSCOPY  2014    gastric polyp    VASECTOMY  1980       Family History   Problem Relation Age of Onset    Early Death Father 59    Heart Disease Father     High Blood Pressure Father        Social History     Tobacco Use    Smoking status: Former Smoker     Packs/day: 1.00     Years: 15.00     Pack years: 15.00     Last attempt to quit: 3/15/2000     Years since quittin.7    Smokeless tobacco: Never Used    Tobacco comment: Reviewed 2015   Substance Use Topics    Alcohol use: No    Drug use: No       Current Outpatient Medications   Medication Sig Dispense Refill    Continuous Blood Gluc Sensor (FREESTYLE CANDACE SENSOR SYSTEM) MISC Check blood sugar 4-6 times a day 1 each 3    Continuous Blood Gluc  (FREESTYLE CANDACE READER) SARAH Use as directed 1 Device 0    oxyCODONE-acetaminophen (PERCOCET) 5-325 MG per tablet Take 1 tablet by mouth every 8 hours as needed for Pain for up to 30 days. 90 tablet 0    mupirocin (BACTROBAN) 2 % ointment Apply topically 3 times daily.  1 Tube 2    sertraline (ZOLOFT) 50 MG tablet Take 2 tablets by mouth daily 180 tablet 0    omeprazole (PRILOSEC) 40 MG delayed release capsule TAKE 1 CAPSULE DAILY 90 capsule 0    traZODone (DESYREL) 100 MG tablet TAKE 1 TABLET NIGHTLY 90 tablet 0    furosemide (LASIX) 40 MG tablet TAKE 1 TABLET DAILY 90 tablet 0    potassium chloride (KLOR-CON M) 20 MEQ extended release tablet TAKE 1 TABLET TWICE A  tablet 0    aspirin 81 MG chewable tablet Take 81 mg by mouth daily      clopidogrel (PLAVIX) 75 MG tablet Take 75 mg by mouth daily      hydrALAZINE (APRESOLINE) 50 MG tablet Take 50 mg by mouth 3 times daily      metoprolol tartrate (LOPRESSOR) 25 MG tablet Take 25 mg by mouth 2 times daily      fluconazole (DIFLUCAN) 150 MG tablet Take 150 mg by mouth once      Coenzyme Q10 (CO Q-10) 100 MG CAPS Take by mouth      nitroGLYCERIN (NITROSTAT) 0.4 MG SL tablet Place 1 tablet under the tongue every 5 minutes as needed for Chest pain 50 tablet 1    carbidopa-levodopa (SINEMET)  MG per tablet TAKE ONE AND ONE-HALF TABLETS NIGHTLY 135 tablet 3    DULoxetine (CYMBALTA) 30 MG extended release capsule TAKE 1 CAPSULE DAILY 90 capsule 3    allopurinol (ZYLOPRIM) 100 MG tablet Take 2 tablets by mouth daily 180 tablet 3    tiZANidine (ZANAFLEX) 2 MG capsule TAKE 1 CAPSULE TWICE A DAY AS NEEDED FOR MUSCLE SPASMS 180 capsule 3    insulin NPH (HUMULIN N) 100 UNIT/ML injection vial INJECT 35 UNITS IN THE MORNING AND 10 UNITS AT SUPPER 50 mL 3    HUMALOG 100 UNIT/ML injection cartridge 6 to 8 units with meals twice a day 5 Cartridge 3    gabapentin (NEURONTIN) 300 MG capsule TAKE 1 CAPSULE IN THE AFTERNOON AND 2 CAPSULES AT BEDTIME 270 capsule 3    Insulin Syringe-Needle U-100 (BD INSULIN SYRINGE U/F) 30G X 1/2\" 0.3 ML MISC INJECT AND USE TWICE A  each 4    dorzolamide-timolol (COSOPT) 22.3-6.8 MG/ML ophthalmic solution 1 drop 2 times daily Both eyes twice a day.  atorvastatin (LIPITOR) 20 MG tablet TAKE 1 TABLET DAILY 90 tablet 3    metOLazone (ZAROXOLYN) 2.5 MG tablet Take one tablet once a week. Take 30 minutes before the Lasix.  4 tablet 5    Probiotic Product (ALIGN PO) Take 1 tablet by mouth daily      Simethicone (GAS-X PO) Take 2 tablets by mouth Three to four times daily as needed      Glucose Blood (BLOOD GLUCOSE TEST STRIPS) STRP [facility] Contour glucose test strips-uses 4x per day or prn with sliding scale coverage   Dx:250.01 Type 1 Diabetes 400 strip 3    ciclopirox (LOPROX) 0.77 % cream Apply to legs, buttocks BID PRN flares 90 g 2    ZACH MICROLET LANCETS MISC Tests 4 to 5 times daily or prn 300 each 3    Polyethylene Glycol 3350 (MIRALAX PO) Take 1 capsule by mouth as needed       B Complex-Biotin-FA (VITAMIN B50 COMPLEX PO) Take 1 tablet by mouth daily.  Vitamin D (CHOLECALCIFEROL) 1000 UNITS CAPS capsule Take 1,000 Units by mouth daily.  latanoprost (XALATAN) 0.005 % ophthalmic solution Place 1 drop into both eyes daily.  docusate sodium (COLACE) 100 MG capsule Take 100 mg by mouth 2 times daily       Calcium Carbonate 500 MG CHEW Take by mouth       No current facility-administered medications for this visit. OBJECTIVE:    /78   Pulse 63   Temp 98.6 °F (37 °C)   Wt 228 lb (103.4 kg)   SpO2 99%   BMI 41.70 kg/m²     Physical Exam  Vitals signs reviewed. Constitutional:       General: He is not in acute distress. Appearance: He is well-developed. Eyes:      Conjunctiva/sclera: Conjunctivae normal.   Neck:      Musculoskeletal: Neck supple. Cardiovascular:      Rate and Rhythm: Normal rate and regular rhythm. Pulmonary:      Effort: Pulmonary effort is normal. No respiratory distress. Breath sounds: Normal breath sounds. Abdominal:      General: Bowel sounds are normal.      Palpations: Abdomen is soft. Tenderness: There is no abdominal tenderness. Musculoskeletal:      Right lower leg: Edema present. Left lower leg: Edema present. Neurological:      Mental Status: He is alert and oriented to person, place, and time. Cranial Nerves: No cranial nerve deficit. Gait: Gait abnormal (ambulates with walker). Psychiatric:         Mood and Affect: Mood normal.         ASSESSMENT:  1. Type 2 diabetes mellitus with diabetic neuropathic arthropathy, with long-term current use of insulin ([address]    2. Spinal stenosis of lumbar region without neurogenic claudication    3. Essential hypertension    4. Depression, unspecified depression type        PLAN:  Orders Placed This Encounter   Medications    Continuous Blood Gluc Sensor (FREESTYLE CANDACE SENSOR SYSTEM[place] [facility] [place]: Check blood sugar 4-6 times a day     Dispense:  1 each     Refill:  3    Continuous Blood Gluc  (FREESTYLE CANDACE READER) SARAH     Sig: Use as directed     Dispense:  1 Device     Refill:  0    oxyCODONE-acetaminophen (PERCOCET) 5-325 MG per tablet     Sig: Take 1 tablet by mouth every 8 hours as needed for Pain for up to 30 days. Dispense:  90 tablet     Refill:  0     Reduce doses taken as pain becomes manageable    mupirocin (BACTROBAN) 2 % ointment     Sig: Apply topically 3 times daily. Dispense:  1 Tube     Refill:  2   Oarrs checked  Last labs reviewed  Refills  ADR's explained  Dietary changes  Keep f/u with specialists          Return for Follow up with new pcp in 3 months.     Electronically Signed by Lee Silvestre DO

## 2020-11-21 PROBLEM — M99.09 SOMATIC DYSFUNCTION OF BACK: Status: RESOLVED | Noted: 2020-02-29 | Resolved: 2020-11-21

## 2020-11-21 PROBLEM — M99.06 SOMATIC DYSFUNCTION OF BOTH LOWER EXTREMITIES: Status: RESOLVED | Noted: 2020-02-29 | Resolved: 2020-11-21

## 2020-11-21 PROBLEM — M99.05 SOMATIC DYSFUNCTION OF PELVIS REGION: Status: RESOLVED | Noted: 2020-02-29 | Resolved: 2020-11-21

## 2024-04-16 LAB — PROCALCITONIN: 0.1 NG/ML

## 2024-04-17 LAB
ALBUMIN: 3 G/DL (ref 3.5–5.7)
ANION GAP SERPL CALCULATED.3IONS-SCNC: 12 MMOL/L (ref 7–16)
BASOPHILS ABSOLUTE: 0 K/UL (ref 0–0.1)
BASOPHILS RELATIVE PERCENT: 0.7 %
BUN BLDV-MCNC: 37 MG/DL (ref 7–25)
CALCIUM SERPL-MCNC: 8.4 MG/DL (ref 8.6–10.2)
CHLORIDE BLD-SCNC: 93 MMOL/L (ref 98–107)
CO2: 27 MMOL/L (ref 21–31)
CREAT SERPL-MCNC: 2.22 MG/DL (ref 0.7–1.3)
EGFR MALE: 29 ML/MIN/1.73M2
EOSINOPHILS ABSOLUTE: 0.1 K/UL (ref 0–0.4)
EOSINOPHILS RELATIVE PERCENT: 1.3 %
GLUCOSE BLD-MCNC: 346 MG/DL (ref 74–109)
HCT VFR BLD CALC: 39.3 % (ref 39–51.5)
HEMOGLOBIN: 13.1 G/DL (ref 13.1–17.6)
LYMPHOCYTES ABSOLUTE: 1.4 K/UL (ref 0.8–3.6)
LYMPHOCYTES RELATIVE PERCENT: 20.6 %
MCH RBC QN AUTO: 31.6 PG (ref 28.4–33.4)
MCHC RBC AUTO-ENTMCNC: 33.3 G/DL (ref 31.1–37)
MCV RBC AUTO: 94.7 FL (ref 85–99)
MONOCYTES ABSOLUTE: 0.6 K/UL (ref 0.3–0.9)
MONOCYTES RELATIVE PERCENT: 8.2 %
NEUTROPHILS ABSOLUTE: 4.7 K/UL (ref 2–7.3)
NEUTROPHILS RELATIVE PERCENT: 69.2 %
PDW BLD-RTO: 14.5 % (ref 11.7–15.2)
PHOSPHORUS: 3.7 MG/DL (ref 2.5–5)
PLATELET # BLD: 178 K/UL (ref 154–393)
POTASSIUM SERPL-SCNC: 4.2 MMOL/L (ref 3.5–5.1)
RBC # BLD: 4.15 M/UL (ref 4.3–5.86)
SODIUM BLD-SCNC: 132 MMOL/L (ref 136–145)
WBC # BLD: 6.8 K/UL (ref 4–10.5)

## 2024-05-25 LAB
ANION GAP SERPL CALCULATED.3IONS-SCNC: 13 MMOL/L (ref 7–16)
BUN BLDV-MCNC: 39 MG/DL (ref 7–25)
CALCIUM SERPL-MCNC: 9.8 MG/DL (ref 8.6–10.2)
CHLORIDE BLD-SCNC: 94 MMOL/L (ref 98–107)
CO2: 29 MMOL/L (ref 21–31)
CREAT SERPL-MCNC: 1.98 MG/DL (ref 0.7–1.3)
EGFR MALE: 34 ML/MIN/1.73M2
GLUCOSE BLD-MCNC: 167 MG/DL (ref 74–109)
POTASSIUM SERPL-SCNC: 4.2 MMOL/L (ref 3.5–5.1)
SODIUM BLD-SCNC: 136 MMOL/L (ref 136–145)

## 2025-01-07 NOTE — TELEPHONE ENCOUNTER
Call when patient can  please Please call the office if your blood sugar is above 400 or under 80. If your blood sugars are abnormal and you have severe or rapidly worsening symptoms please call 911.